# Patient Record
Sex: MALE | Race: WHITE | Employment: FULL TIME | URBAN - METROPOLITAN AREA
[De-identification: names, ages, dates, MRNs, and addresses within clinical notes are randomized per-mention and may not be internally consistent; named-entity substitution may affect disease eponyms.]

---

## 2018-05-01 PROBLEM — E66.9 OBESITY (BMI 35.0-39.9 WITHOUT COMORBIDITY): Status: ACTIVE | Noted: 2018-05-01

## 2019-05-03 ENCOUNTER — HOSPITAL ENCOUNTER (EMERGENCY)
Age: 49
Discharge: HOME | End: 2019-05-03
Attending: STUDENT IN AN ORGANIZED HEALTH CARE EDUCATION/TRAINING PROGRAM
Payer: COMMERCIAL

## 2019-05-03 ENCOUNTER — APPOINTMENT (OUTPATIENT)
Dept: GENERAL RADIOLOGY | Age: 49
End: 2019-05-03
Attending: STUDENT IN AN ORGANIZED HEALTH CARE EDUCATION/TRAINING PROGRAM
Payer: COMMERCIAL

## 2019-05-03 VITALS
HEART RATE: 80 BPM | SYSTOLIC BLOOD PRESSURE: 135 MMHG | BODY MASS INDEX: 37.89 KG/M2 | RESPIRATION RATE: 16 BRPM | DIASTOLIC BLOOD PRESSURE: 84 MMHG | TEMPERATURE: 98.1 F | HEIGHT: 68 IN | WEIGHT: 250 LBS | OXYGEN SATURATION: 99 %

## 2019-05-03 DIAGNOSIS — M25.521 ELBOW PAIN, RIGHT: Primary | ICD-10-CM

## 2019-05-03 PROCEDURE — 73080 X-RAY EXAM OF ELBOW: CPT

## 2019-05-03 PROCEDURE — 99283 EMERGENCY DEPT VISIT LOW MDM: CPT | Performed by: STUDENT IN AN ORGANIZED HEALTH CARE EDUCATION/TRAINING PROGRAM

## 2019-05-03 PROCEDURE — 74011250636 HC RX REV CODE- 250/636: Performed by: STUDENT IN AN ORGANIZED HEALTH CARE EDUCATION/TRAINING PROGRAM

## 2019-05-03 PROCEDURE — 96372 THER/PROPH/DIAG INJ SC/IM: CPT | Performed by: STUDENT IN AN ORGANIZED HEALTH CARE EDUCATION/TRAINING PROGRAM

## 2019-05-03 RX ORDER — IBUPROFEN 800 MG/1
800 TABLET ORAL
Qty: 20 TAB | Refills: 0 | Status: SHIPPED | OUTPATIENT
Start: 2019-05-03 | End: 2019-05-10

## 2019-05-03 RX ORDER — KETOROLAC TROMETHAMINE 30 MG/ML
30 INJECTION, SOLUTION INTRAMUSCULAR; INTRAVENOUS
Status: COMPLETED | OUTPATIENT
Start: 2019-05-03 | End: 2019-05-03

## 2019-05-03 RX ADMIN — KETOROLAC TROMETHAMINE 30 MG: 30 INJECTION, SOLUTION INTRAMUSCULAR at 18:18

## 2019-05-03 NOTE — ED NOTES
I have reviewed discharge instructions with the patient. The patient verbalized understanding. Patient left ED via Discharge Method: ambulatory to Home with self. Opportunity for questions and clarification provided. Patient given 1 scripts. To continue your aftercare when you leave the hospital, you may receive an automated call from our care team to check in on how you are doing. This is a free service and part of our promise to provide the best care and service to meet your aftercare needs.  If you have questions, or wish to unsubscribe from this service please call [phone]. Thank you for Choosing our [facility] Emergency Department.

## 2019-05-03 NOTE — DISCHARGE INSTRUCTIONS
Patient Education        Elbow: Exercises  Your Care Instructions  Here are some examples of exercises for elbows. Start each exercise slowly. Ease off the exercise if you start to have pain. Your doctor or physical therapist will tell you when you can start these exercises and which ones will work best for you. How to do the exercises  Wrist flexor stretch    1. Extend your arm in front of you with your palm up. 2. Bend your wrist, pointing your hand toward the floor. 3. With your other hand, gently bend your wrist farther until you feel a mild to moderate stretch in your forearm. 4. Hold for at least 15 to 30 seconds. Repeat 2 to 4 times. Wrist extensor stretch    1. Repeat steps 1 to 4 of the stretch above but begin with your extended hand palm down. Ball or sock squeeze    1. Hold a tennis ball (or a rolled-up sock) in your hand. 2. Make a fist around the ball (or sock) and squeeze. 3. Hold for about 6 seconds, and then relax for up to 10 seconds. 4. Repeat 8 to 12 times. 5. Switch the ball (or sock) to your other hand and do 8 to 12 times. Wrist deviation    1. Sit so that your arm is supported but your hand hangs off the edge of a flat surface, such as a table. 2. Hold your hand out like you are shaking hands with someone. 3. Move your hand up and down. 4. Repeat this motion 8 to 12 times. 5. Switch arms. 6. Try to do this exercise twice with each hand. Wrist curls    1. Place your forearm on a table with your hand hanging over the edge of the table, palm up. 2. Place a 1- to 2-pound weight in your hand. This may be a dumbbell, a can of food, or a filled water bottle. 3. Slowly raise and lower the weight while keeping your forearm on the table and your palm facing up. 4. Repeat this motion 8 to 12 times. 5. Switch arms, and do steps 1 through 4.  6. Repeat with your hand facing down toward the floor. Switch arms. Biceps curls    1.  Sit leaning forward with your legs slightly spread and your left hand on your left thigh. 2. Place your right elbow on your right thigh, and hold the weight with your forearm horizontal.  3. Slowly curl the weight up and toward your chest.  4. Repeat this motion 8 to 12 times. 5. Switch arms, and do steps 1 through 4. Follow-up care is a key part of your treatment and safety. Be sure to make and go to all appointments, and call your doctor if you are having problems. It's also a good idea to know your test results and keep a list of the medicines you take. Where can you learn more? Go to http://sandeep-beatriz.info/. Enter M279 in the search box to learn more about \"Elbow: Exercises. \"  Current as of: September 20, 2018  Content Version: 11.9  © 3002-6909 [employer]. Care instructions adapted under license by [employer] (which disclaims liability or warranty for this information). If you have questions about a medical condition or this instruction, always ask your healthcare professional. [place] [address] any warranty or liability for your use of this information. Patient Education     Musculoskeletal Pain: Care Instructions  Your Care Instructions  Different problems with the bones, muscles, nerves, ligaments, and tendons in the body can cause pain. One or more areas of your body may ache or burn. Or they may feel tired, stiff, or sore. The medical term for this type of pain is musculoskeletal pain. It can have many different causes. Sometimes the pain is caused by an injury such as a strain or sprain. Or you might have pain from using one part of your body in the same way over and over again. This is called overuse. In some cases, the cause of the pain is another health problem such as arthritis or fibromyalgia. The doctor will examine you and ask you questions about your health to help find the cause of your pain. Blood tests or imaging tests like an X-ray may also be helpful. But sometimes doctors can't find a cause of the pain. Treatment depends on your symptoms and the cause of the pain, if known. The doctor has checked you carefully, but problems can develop later. If you notice any problems or new symptoms, get medical treatment right away. Follow-up care is a key part of your treatment and safety. Be sure to make and go to all appointments, and call your doctor if you are having problems. It's also a good idea to know your test results and keep a list of the medicines you take. How can you care for yourself at home? · Rest until you feel better. · Do not do anything that makes the pain worse. Return to exercise gradually if you feel better and your doctor says it's okay. · Be safe with medicines. Read and follow all instructions on the label. ¨ If the doctor gave you a prescription medicine for pain, take it as prescribed. ¨ If you are not taking a prescription pain medicine, ask your doctor if you can take an over-the-counter medicine. · Put ice or a cold pack on the area for 10 to 20 minutes at a time to ease pain. Put a thin cloth between the ice and your skin. When should you call for help? Call your doctor now or seek immediate medical care if:  · You have new pain, or your pain gets worse. · You have new symptoms such as a fever, a rash, or chills. Watch closely for changes in your health, and be sure to contact your doctor if:  · You do not get better as expected. Where can you learn more? Go to [employer]be  Enter Q624 in the search box to learn more about \"Musculoskeletal Pain: Care Instructions. \"   © 1696-7622 [employer]. Care instructions adapted under license by [facility] (which disclaims liability or warranty for this information).  This care instruction is for use with your licensed healthcare professional. If you have questions about a medical condition or this instruction, always ask your healthcare professional. [address] any warranty or liability for your use of this information.   Content Version: 63.6.202058; Current as of: November 20, 2015
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
## 2019-06-12 ENCOUNTER — HOSPITAL ENCOUNTER (OUTPATIENT)
Dept: SURGERY | Age: 49
Discharge: HOME | End: 2019-06-12

## 2019-06-12 ENCOUNTER — ANESTHESIA EVENT (OUTPATIENT)
Dept: SURGERY | Age: 49
End: 2019-06-12
Payer: COMMERCIAL

## 2019-06-12 VITALS — HEIGHT: 68 IN | BODY MASS INDEX: 38.49 KG/M2 | WEIGHT: 254 LBS

## 2019-06-12 PROBLEM — S46.211A RUPTURE OF RIGHT DISTAL BICEPS TENDON: Status: ACTIVE | Noted: 2019-06-12

## 2019-06-12 RX ORDER — SODIUM CHLORIDE 0.9 % (FLUSH) 0.9 %
5-40 SYRINGE (ML) INJECTION EVERY 8 HOURS
Status: CANCELLED | OUTPATIENT
Start: 2019-06-12

## 2019-06-12 RX ORDER — CEFAZOLIN SODIUM/WATER 2 G/20 ML
2 SYRINGE (ML) INTRAVENOUS ONCE
Status: CANCELLED | OUTPATIENT
Start: 2019-06-12 | End: 2019-06-12

## 2019-06-12 RX ORDER — SODIUM CHLORIDE 0.9 % (FLUSH) 0.9 %
5-40 SYRINGE (ML) INJECTION AS NEEDED
Status: CANCELLED | OUTPATIENT
Start: 2019-06-12

## 2019-06-12 NOTE — BRIEF OP NOTE
BRIEF OPERATIVE NOTE    Date of Procedure: 6/13/2019     Preoperative Diagnosis:  DISTAL BICEPS TENDON RUPTURE RIGHT ELBOW    Postoperative Diagnosis:  SAME    Procedure(s): OPEN REPAIR DISTAL BICEPS TENDON RUPTURE RIGHT ELBOW VIA SINGLE INCISION TECHNIQUE    Surgeon(s) and Role:     * Wilmer Johnson MD - Primary         Assistant Staff:  Db Wang CFA      Surgical Staff:  Circ-1: (Unknown)  Scrub Tech-1: (Unknown)  Scrub Tech-2: (Unknown)  Scrub Tech-3: (Unknown)  No case tracking events are documented in the log. Anesthesia:  GENERAL WITH SUPRACLAVICULAR BLOCK    Estimated Blood Loss: 10 cc. Complications: NONE    Implants:   Implant Name Type Inv.  Item Serial No.  Lot No. LRB No. Used Action   ANCHOR SUT 2.3MM W/2.0MM BRAID --  - [id number]  ANCHOR SUT 2.3MM W/2.0MM BRAID --  [id number] [employer] [id number] Right 1 Implanted   ANCHOR SUT 2.3MM W/2.0MM BRAID --  - [id number]  ANCHOR SUT 2.3MM W/2.0MM BRAID --  [id number] [employer] [id number] Right 1 Implanted       TOURNIQUET:  55 MINUTES    Duke Davis MD

## 2019-06-12 NOTE — PERIOP NOTES
Patient verified name and . Order for consent not found in EHR. Type 1b surgery, PAT phone assessment complete. Orders not received. Labs per surgeon: none  Labs per anesthesia protocol: K+ (to be drawn DOS)    Patient answered medical/surgical history questions at their best of ability. All prior to admission medications documented in [facility]. Patient instructed to take the following medications the day of surgery according to anesthesia guidelines with a small sip of water: none . Hold all vitamins 7 days prior to surgery and NSAIDS 5 days prior to surgery. Prescription meds to hold:none    Patient instructed on the following:  Arrive at A Entrance, time of arrival to be called the day before by 1700  NPO after midnight including gum, mints, and ice chips  Responsible adult must drive patient to the hospital, stay during surgery, and patient will need supervision 24 hours after anesthesia  Use antibacterial soap in shower the night before surgery and on the morning of surgery  All piercings must be removed prior to arrival.    Leave all valuables (money and jewelry) at home but bring insurance card and ID on       DOS. Do not wear make-up, nail polish, lotions, cologne, perfumes, powders, or oil on skin. Patient teach back successful and patient demonstrates knowledge of instruction.
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
## 2019-06-13 ENCOUNTER — APPOINTMENT (OUTPATIENT)
Dept: GENERAL RADIOLOGY | Age: 49
End: 2019-06-13
Attending: ORTHOPAEDIC SURGERY
Payer: COMMERCIAL

## 2019-06-13 ENCOUNTER — ANESTHESIA (OUTPATIENT)
Dept: SURGERY | Age: 49
End: 2019-06-13
Payer: COMMERCIAL

## 2019-06-13 ENCOUNTER — HOSPITAL ENCOUNTER (OUTPATIENT)
Age: 49
Setting detail: OUTPATIENT SURGERY
Discharge: HOME | End: 2019-06-13
Attending: ORTHOPAEDIC SURGERY | Admitting: ORTHOPAEDIC SURGERY
Payer: COMMERCIAL

## 2019-06-13 VITALS
TEMPERATURE: 97.1 F | DIASTOLIC BLOOD PRESSURE: 70 MMHG | WEIGHT: 252.06 LBS | RESPIRATION RATE: 18 BRPM | HEART RATE: 91 BPM | OXYGEN SATURATION: 93 % | HEIGHT: 68 IN | SYSTOLIC BLOOD PRESSURE: 120 MMHG | BODY MASS INDEX: 38.2 KG/M2

## 2019-06-13 LAB
EST. AVERAGE GLUCOSE BLD GHB EST-MCNC: 131 MG/DL
GLUCOSE BLD STRIP.AUTO-MCNC: 99 MG/DL (ref 65–100)
HBA1C MFR BLD: 6.2 %
POTASSIUM SERPL-SCNC: 4 MMOL/L (ref 3.5–5.1)

## 2019-06-13 PROCEDURE — 73070 X-RAY EXAM OF ELBOW: CPT

## 2019-06-13 PROCEDURE — 77030037088 HC TUBE ENDOTRACH ORAL NSL COVD-A: Performed by: ANESTHESIOLOGY

## 2019-06-13 PROCEDURE — C1713 ANCHOR/SCREW BN/BN,TIS/BN: HCPCS | Performed by: ORTHOPAEDIC SURGERY

## 2019-06-13 PROCEDURE — 76210000020 HC REC RM PH II FIRST 0.5 HR: Performed by: ORTHOPAEDIC SURGERY

## 2019-06-13 PROCEDURE — 83036 HEMOGLOBIN GLYCOSYLATED A1C: CPT

## 2019-06-13 PROCEDURE — 76210000006 HC OR PH I REC 0.5 TO 1 HR: Performed by: ORTHOPAEDIC SURGERY

## 2019-06-13 PROCEDURE — 77030016570 HC BLNKT BAIR HGGR 3M -B: Performed by: ANESTHESIOLOGY

## 2019-06-13 PROCEDURE — 74011250636 HC RX REV CODE- 250/636: Performed by: ANESTHESIOLOGY

## 2019-06-13 PROCEDURE — 77030013708 HC HNDPC SUC IRR PULS STRY –B: Performed by: ORTHOPAEDIC SURGERY

## 2019-06-13 PROCEDURE — 76060000034 HC ANESTHESIA 1.5 TO 2 HR: Performed by: ORTHOPAEDIC SURGERY

## 2019-06-13 PROCEDURE — 77030031139 HC SUT VCRL2 J&J -A: Performed by: ORTHOPAEDIC SURGERY

## 2019-06-13 PROCEDURE — 77030002913 HC SUT ETHBND J&J -B: Performed by: ORTHOPAEDIC SURGERY

## 2019-06-13 PROCEDURE — 77030021678 HC GLIDESCP STAT DISP VERT -B: Performed by: ANESTHESIOLOGY

## 2019-06-13 PROCEDURE — 77030010509 HC AIRWY LMA MSK TELE -A: Performed by: ANESTHESIOLOGY

## 2019-06-13 PROCEDURE — 77030012935 HC DRSG AQUACEL BMS -B: Performed by: ORTHOPAEDIC SURGERY

## 2019-06-13 PROCEDURE — 77030002916 HC SUT ETHLN J&J -A: Performed by: ORTHOPAEDIC SURGERY

## 2019-06-13 PROCEDURE — 82962 GLUCOSE BLOOD TEST: CPT

## 2019-06-13 PROCEDURE — 77030008477 HC STYL SATN SLP COVD -A: Performed by: ANESTHESIOLOGY

## 2019-06-13 PROCEDURE — 76942 ECHO GUIDE FOR BIOPSY: CPT | Performed by: ORTHOPAEDIC SURGERY

## 2019-06-13 PROCEDURE — 77030018836 HC SOL IRR NACL ICUM -A: Performed by: ORTHOPAEDIC SURGERY

## 2019-06-13 PROCEDURE — 74011250636 HC RX REV CODE- 250/636

## 2019-06-13 PROCEDURE — 76010010054 HC POST OP PAIN BLOCK: Performed by: ORTHOPAEDIC SURGERY

## 2019-06-13 PROCEDURE — 77030011283 HC ELECTRD NDL COVD -A: Performed by: ORTHOPAEDIC SURGERY

## 2019-06-13 PROCEDURE — 76010000162 HC OR TIME 1.5 TO 2 HR INTENSV-TIER 1: Performed by: ORTHOPAEDIC SURGERY

## 2019-06-13 PROCEDURE — 77030002986 HC SUT PROL J&J -A: Performed by: ORTHOPAEDIC SURGERY

## 2019-06-13 PROCEDURE — 74011000250 HC RX REV CODE- 250

## 2019-06-13 PROCEDURE — 84132 ASSAY OF SERUM POTASSIUM: CPT

## 2019-06-13 PROCEDURE — 77030003602 HC NDL NRV BLK BBMI -B: Performed by: ANESTHESIOLOGY

## 2019-06-13 DEVICE — ICONIX 2 NEEDLES WITH INTELLIBRAID TECHNOLOGY, 2.3MM ANCHOR WITH 2 STRANDS #2 FORCE FIBER
Type: IMPLANTABLE DEVICE | Site: ELBOW | Status: FUNCTIONAL
Brand: ICONIX

## 2019-06-13 RX ORDER — SUCCINYLCHOLINE CHLORIDE 20 MG/ML
INJECTION INTRAMUSCULAR; INTRAVENOUS AS NEEDED
Status: DISCONTINUED | OUTPATIENT
Start: 2019-06-13 | End: 2019-06-13 | Stop reason: HOSPADM

## 2019-06-13 RX ORDER — DEXAMETHASONE SODIUM PHOSPHATE 4 MG/ML
INJECTION, SOLUTION INTRA-ARTICULAR; INTRALESIONAL; INTRAMUSCULAR; INTRAVENOUS; SOFT TISSUE AS NEEDED
Status: DISCONTINUED | OUTPATIENT
Start: 2019-06-13 | End: 2019-06-13 | Stop reason: HOSPADM

## 2019-06-13 RX ORDER — LIDOCAINE HYDROCHLORIDE 20 MG/ML
INJECTION, SOLUTION EPIDURAL; INFILTRATION; INTRACAUDAL; PERINEURAL AS NEEDED
Status: DISCONTINUED | OUTPATIENT
Start: 2019-06-13 | End: 2019-06-13 | Stop reason: HOSPADM

## 2019-06-13 RX ORDER — CEFAZOLIN SODIUM/WATER 2 G/20 ML
2 SYRINGE (ML) INTRAVENOUS ONCE
Status: DISCONTINUED | OUTPATIENT
Start: 2019-06-13 | End: 2019-06-13 | Stop reason: HOSPADM

## 2019-06-13 RX ORDER — SODIUM CHLORIDE 0.9 % (FLUSH) 0.9 %
5-40 SYRINGE (ML) INJECTION AS NEEDED
Status: DISCONTINUED | OUTPATIENT
Start: 2019-06-13 | End: 2019-06-13 | Stop reason: HOSPADM

## 2019-06-13 RX ORDER — FENTANYL CITRATE 50 UG/ML
INJECTION, SOLUTION INTRAMUSCULAR; INTRAVENOUS AS NEEDED
Status: DISCONTINUED | OUTPATIENT
Start: 2019-06-13 | End: 2019-06-13 | Stop reason: HOSPADM

## 2019-06-13 RX ORDER — MIDAZOLAM HYDROCHLORIDE 1 MG/ML
2 INJECTION, SOLUTION INTRAMUSCULAR; INTRAVENOUS
Status: DISCONTINUED | OUTPATIENT
Start: 2019-06-13 | End: 2019-06-13 | Stop reason: HOSPADM

## 2019-06-13 RX ORDER — OXYCODONE HYDROCHLORIDE 5 MG/1
5 TABLET ORAL
Status: DISCONTINUED | OUTPATIENT
Start: 2019-06-13 | End: 2019-06-13 | Stop reason: HOSPADM

## 2019-06-13 RX ORDER — SODIUM CHLORIDE, SODIUM LACTATE, POTASSIUM CHLORIDE, CALCIUM CHLORIDE 600; 310; 30; 20 MG/100ML; MG/100ML; MG/100ML; MG/100ML
100 INJECTION, SOLUTION INTRAVENOUS CONTINUOUS
Status: DISCONTINUED | OUTPATIENT
Start: 2019-06-13 | End: 2019-06-13 | Stop reason: HOSPADM

## 2019-06-13 RX ORDER — FENTANYL CITRATE 50 UG/ML
100 INJECTION, SOLUTION INTRAMUSCULAR; INTRAVENOUS ONCE
Status: COMPLETED | OUTPATIENT
Start: 2019-06-13 | End: 2019-06-13

## 2019-06-13 RX ORDER — SODIUM CHLORIDE 0.9 % (FLUSH) 0.9 %
5-40 SYRINGE (ML) INJECTION EVERY 8 HOURS
Status: DISCONTINUED | OUTPATIENT
Start: 2019-06-13 | End: 2019-06-13 | Stop reason: HOSPADM

## 2019-06-13 RX ORDER — NALOXONE HYDROCHLORIDE 0.4 MG/ML
0.04 INJECTION, SOLUTION INTRAMUSCULAR; INTRAVENOUS; SUBCUTANEOUS
Status: DISCONTINUED | OUTPATIENT
Start: 2019-06-13 | End: 2019-06-13 | Stop reason: HOSPADM

## 2019-06-13 RX ORDER — ONDANSETRON 2 MG/ML
INJECTION INTRAMUSCULAR; INTRAVENOUS AS NEEDED
Status: DISCONTINUED | OUTPATIENT
Start: 2019-06-13 | End: 2019-06-13 | Stop reason: HOSPADM

## 2019-06-13 RX ORDER — HYDROMORPHONE HYDROCHLORIDE 2 MG/ML
0.5 INJECTION, SOLUTION INTRAMUSCULAR; INTRAVENOUS; SUBCUTANEOUS
Status: DISCONTINUED | OUTPATIENT
Start: 2019-06-13 | End: 2019-06-13 | Stop reason: HOSPADM

## 2019-06-13 RX ORDER — PROPOFOL 10 MG/ML
INJECTION, EMULSION INTRAVENOUS AS NEEDED
Status: DISCONTINUED | OUTPATIENT
Start: 2019-06-13 | End: 2019-06-13 | Stop reason: HOSPADM

## 2019-06-13 RX ORDER — EPHEDRINE SULFATE 50 MG/ML
INJECTION, SOLUTION INTRAVENOUS AS NEEDED
Status: DISCONTINUED | OUTPATIENT
Start: 2019-06-13 | End: 2019-06-13 | Stop reason: HOSPADM

## 2019-06-13 RX ORDER — LIDOCAINE HYDROCHLORIDE 10 MG/ML
0.1 INJECTION INFILTRATION; PERINEURAL AS NEEDED
Status: DISCONTINUED | OUTPATIENT
Start: 2019-06-13 | End: 2019-06-13 | Stop reason: HOSPADM

## 2019-06-13 RX ORDER — MIDAZOLAM HYDROCHLORIDE 1 MG/ML
2 INJECTION, SOLUTION INTRAMUSCULAR; INTRAVENOUS ONCE
Status: COMPLETED | OUTPATIENT
Start: 2019-06-13 | End: 2019-06-13

## 2019-06-13 RX ADMIN — LIDOCAINE HYDROCHLORIDE 60 MG: 20 INJECTION, SOLUTION EPIDURAL; INFILTRATION; INTRACAUDAL; PERINEURAL at 13:45

## 2019-06-13 RX ADMIN — EPHEDRINE SULFATE 10 MG: 50 INJECTION, SOLUTION INTRAVENOUS at 14:50

## 2019-06-13 RX ADMIN — FENTANYL CITRATE 50 MCG: 50 INJECTION, SOLUTION INTRAMUSCULAR; INTRAVENOUS at 13:45

## 2019-06-13 RX ADMIN — ONDANSETRON 4 MG: 2 INJECTION INTRAMUSCULAR; INTRAVENOUS at 15:03

## 2019-06-13 RX ADMIN — PROPOFOL 100 MG: 10 INJECTION, EMULSION INTRAVENOUS at 13:47

## 2019-06-13 RX ADMIN — SODIUM CHLORIDE, SODIUM LACTATE, POTASSIUM CHLORIDE, AND CALCIUM CHLORIDE: 600; 310; 30; 20 INJECTION, SOLUTION INTRAVENOUS at 13:41

## 2019-06-13 RX ADMIN — PROPOFOL 300 MG: 10 INJECTION, EMULSION INTRAVENOUS at 13:45

## 2019-06-13 RX ADMIN — SODIUM CHLORIDE, SODIUM LACTATE, POTASSIUM CHLORIDE, AND CALCIUM CHLORIDE: 600; 310; 30; 20 INJECTION, SOLUTION INTRAVENOUS at 14:13

## 2019-06-13 RX ADMIN — LIDOCAINE HYDROCHLORIDE 0.1 ML: 10 INJECTION, SOLUTION INFILTRATION; PERINEURAL at 10:50

## 2019-06-13 RX ADMIN — EPHEDRINE SULFATE 10 MG: 50 INJECTION, SOLUTION INTRAVENOUS at 14:16

## 2019-06-13 RX ADMIN — FENTANYL CITRATE 100 MCG: 50 INJECTION INTRAMUSCULAR; INTRAVENOUS at 11:44

## 2019-06-13 RX ADMIN — SODIUM CHLORIDE, SODIUM LACTATE, POTASSIUM CHLORIDE, AND CALCIUM CHLORIDE 100 ML/HR: 600; 310; 30; 20 INJECTION, SOLUTION INTRAVENOUS at 10:50

## 2019-06-13 RX ADMIN — MIDAZOLAM 2 MG: 1 INJECTION INTRAMUSCULAR; INTRAVENOUS at 11:44

## 2019-06-13 RX ADMIN — SUCCINYLCHOLINE CHLORIDE 200 MG: 20 INJECTION INTRAMUSCULAR; INTRAVENOUS at 13:49

## 2019-06-13 RX ADMIN — DEXAMETHASONE SODIUM PHOSPHATE 10 MG: 4 INJECTION, SOLUTION INTRA-ARTICULAR; INTRALESIONAL; INTRAMUSCULAR; INTRAVENOUS; SOFT TISSUE at 14:01

## 2019-06-13 NOTE — ANESTHESIA PREPROCEDURE EVALUATION
Relevant Problems   No relevant active problems       Anesthetic History   No history of anesthetic complications            Review of Systems / Medical History  Pertinent labs reviewed    Pulmonary        Sleep apnea: CPAP           Neuro/Psych         Psychiatric history     Cardiovascular    Hypertension              Exercise tolerance: >4 METS     GI/Hepatic/Renal     GERD: well controlled           Endo/Other        Obesity     Other Findings              Physical Exam    Airway  Mallampati: III  TM Distance: 4 - 6 cm  Neck ROM: normal range of motion, short neck   Mouth opening: Normal     Cardiovascular  Regular rate and rhythm,  S1 and S2 normal,  no murmur, click, rub, or gallop             Dental  No notable dental hx       Pulmonary  Breath sounds clear to auscultation               Abdominal  GI exam deferred       Other Findings            Anesthetic Plan    ASA: 2  Anesthesia type: general      Post-op pain plan if not by surgeon: peripheral nerve block single    Induction: Intravenous  Anesthetic plan and risks discussed with: Patient and Spouse

## 2019-06-13 NOTE — DISCHARGE INSTRUCTIONS
INSTRUCTIONS FOLLOWING ARTHROSCOPY SURGERY  Dr. Delon Moscoso [phone]    ACTIVITY   As tolerated and as directed by your doctor   Elevate surgery site first 48 hours.  Use arm sling or crutches per your doctors instructions.  Bathe or shower as directed by your doctor. DIET   Clear liquids until no nausea or vomiting; then light diet for the first day   Advance to regular diet on second day, unless your doctor orders otherwise.  If nausea and vomiting continues, call your doctor. PAIN   Take pain medication as directed by your doctor.  Call your doctor if pain is NOT relieved by medication.  DO NOT take aspirin or blood thinners until directed by your doctor. DRESSING CARE: Follow all dressing care instructions provided by Dr. Nasim Ratliff will be made by nursing staff.  If you have any problems or concerns, call your doctor as needed. CALL YOUR DOCTOR IF   Excessive bleeding that does not stop after holding mild pressure over the area   Temperature of 101°F or above   Redness, excessive swelling or bruising, and/or green or yellow, smelly discharge from incision    AFTER ANESTHESIA   For the next 24 hours: DO NOT Drive, Drink alcoholic beverages, or Make important decisions.  Be aware of dizziness following anesthesia and while taking pain medication.

## 2019-06-13 NOTE — ANESTHESIA PROCEDURE NOTES
Peripheral Block    Start time: 6/13/2019 11:44 AM  End time: 6/13/2019 11:50 AM  Performed by: Marvel Singh MD  Authorized by: Marvel Singh MD       Pre-procedure: Indications: at surgeon's request, post-op pain management and procedure for pain    Preanesthetic Checklist: patient identified, risks and benefits discussed, site marked, timeout performed, anesthesia consent given and patient being monitored    Timeout Time: 11:44          Block Type:   Block Type:  Supraclavicular  Laterality:  Right  Monitoring:  Standard ASA monitoring, continuous pulse ox, frequent vital sign checks, heart rate, oxygen and responsive to questions  Injection Technique:  Single shot  Procedures: ultrasound guided and nerve stimulator    Prep: chlorhexidine    Needle Type:  Stimuplex (4 Inch)  Needle Gauge:  20 G  Needle Localization:  Ultrasound guidance and nerve stimulator  Motor Response: minimal motor response >0.4 mA      Assessment:  Number of attempts:  1  Injection Assessment:  Incremental injection every 5 mL, local visualized surrounding nerve on ultrasound, negative aspiration for blood, no paresthesia, no intravascular symptoms and ultrasound image on chart  Patient tolerance:  Patient tolerated the procedure well with no immediate complications  3 cc 1% lidocaine injected at site of needle insertion. Needle inserted and placed in close proximity to the nerve under real time ultrasound guidance. Ultrasound was used to visualize the spread of local anesthetic in close proximity to the nerve being blocked. The nerve appeared anatomically normal and there were no abnormal findings.

## 2019-06-13 NOTE — ANESTHESIA POSTPROCEDURE EVALUATION
Procedure(s):  OPEN REPAIR DISTAL BICEPS TENDON RUPTURE RIGHT ELBOW VIA SINGLE INCISION TECHNIQUE.     general    Anesthesia Post Evaluation        Patient location during evaluation: PACU  Patient participation: complete - patient participated  Level of consciousness: awake  Pain management: satisfactory to patient  Airway patency: patent  Anesthetic complications: no  Cardiovascular status: hemodynamically stable  Respiratory status: spontaneous ventilation  Hydration status: euvolemic  Post anesthesia nausea and vomiting:  none      Vitals Value Taken Time   /72 6/13/2019  3:37 PM   Temp 36.2 °C (97.1 °F) 6/13/2019  3:22 PM   Pulse 84 6/13/2019  3:37 PM   Resp 18 6/13/2019  3:37 PM   SpO2 98 % 6/13/2019  3:37 PM

## 2019-06-13 NOTE — H&P
Date of Surgery Update:  Lc Sears was seen and examined. History and physical has been reviewed. The patient has been examined.  There have been no significant clinical changes since the completion of the originally dated History and Physical.    Signed By: Delmus Burkitt., MD     June 13, 2019 10:59 AM

## 2019-06-14 NOTE — OP NOTES
[place]  OPERATIVE REPORT    Name:  Julian Ascencio  MR#:  [id number]  :  1970  ACCOUNT #:  [de-identified]  DATE OF SERVICE:  2019    PREOPERATIVE DIAGNOSIS:  Distal biceps tendon rupture, right elbow. POSTOPERATIVE DIAGNOSIS:  Distal biceps tendon rupture, right elbow. PROCEDURE PERFORMED:  Open repair of the distal biceps tendon rupture of right elbow via single incision technique. OPERATIVE SURGEON:  Idalia Chapman. Magui Dinh MD    ASSISTANT:  Nadja Coyne, Certified First Assistant. Use of a certified first assistant was necessary to optimize the patient's safety and outcome. ANESTHESIA:  General with a supraclavicular block. COMPLICATIONS:  None. IMPLANTS:  Hardware utilized are two Iconix 2.3 anchors. ESTIMATED BLOOD LOSS:  20 mL. PATHOLOGY:  Distal biceps tendon rupture, right elbow. CPT CODE:  20506. ICD-10 CODE:  S21.230. INDICATIONS:  The patient is a 48-year-old gentleman who was moving a basketball goal when injured his right elbow. Preoperative physical exam and radiographic studies including an MRI demonstrated a distal biceps tendon rupture of right elbow. The patient has exhausted nonoperative modalities and electively admitted for operative intervention. PROCEDURE:  Following identification of the patient, the patient was taken to the operative suite. Following administration of general anesthesia, a supraclavicular block for postop pain control and 3 g of IV Ancef, the patient was then positioned on the operating room table in supine fashion. Right arm was then prepped and draped in sterile fashion. Right arm was then elevated and exsanguinated with an Esmarch bandage. Tourniquet was elevated to 250 mmHg. At this point, a 3 cm transverse incision in the antecubital fossa was then performed. Skin was incised. Subcutaneous tissue was then dissected down to the brachioradialis and pronator teres.   These were identified and retracted. Biceps tendon was identified. It was ruptured distally. It was completely mobilized. Frayed edges of the tendon were then debrided and the biceps was then completely mobilized. At this point, with the arm in supination, the bicipital tuberosity of the radius was then identified. Care was taken to retract the soft tissues. Once the bicipital tuberosity of the radius was identified. Two Iconix 2.3 anchors were placed proximally and distally in the tuberosity. These had excellent fixation and were firmly seated. At this point, the biceps was identified, suture anchors were then passed through the remaining biceps tendon in a modified Sims type fashion of the medial and lateral aspects. Both sutures were utilized. Once this was achieved the tendon was then reduced and all sutures were secured in place. This yielded an excellent repair of our distal biceps tendon, which was stable to full range of motion. Once the biceps tendon was then secured, the wound was irrigated the tourniquet was released and the tourniquet time was 55 minutes. Hemostasis was obtained with Bovie electrocautery. The wound was then irrigated and closed with 0 Vicryl figure-of-eight sutures and 2-0 nylon horizontal mattress sutures. An Aquacel dressing was applied. A posterior splint was applied. The patient was then transferred to the recovery room in stable condition.         Wang Murillo MD      [id number]  D:  06/13/2019 21:31  T:  06/14/2019 2:57  JOB #:  [id number]  CC:  Chloe Mendez MD

## 2019-07-08 ENCOUNTER — HOSPITAL ENCOUNTER (OUTPATIENT)
Dept: PHYSICAL THERAPY | Age: 49
Discharge: HOME | End: 2019-07-08
Payer: COMMERCIAL

## 2019-07-08 PROCEDURE — 97140 MANUAL THERAPY 1/> REGIONS: CPT

## 2019-07-08 PROCEDURE — 97161 PT EVAL LOW COMPLEX 20 MIN: CPT
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
## 2019-07-09 NOTE — PROGRESS NOTES
Barrera Reyes  : 1970  Payor: [employer] / Plan: [address] / Product Type: PPO /  [address] at [facility] DIA MCMAHON  [address] [facility], [address] [place] [address],[address], [address]  [phone]   [phone]       OUTPATIENT PHYSICAL THERAPY: Daily Treatment Note 2019  Visit Count: 1     ICD-10: Treatment Diagnosis: Pain in right elbow (M25.521)  Precautions/Allergies:   Ace inhibitors   MD Orders: Evaluate and treat \"gentle elbow and hand motion\" MEDICAL/REFERRING DIAGNOSIS:  R distal Biceps Repair   DATE OF ONSET: Surgery 19, injury 5/3/19  REFERRING PHYSICIAN: Mariela Martínez MD  RETURN PHYSICIAN APPOINTMENT: 19       Pre-treatment Symptoms/Complaints:  See eval note from same day. Pain: Initial:  5/10  Post Session:  No VAS   Medications Last Reviewed: 19    Updated Objective Findings:   See evaluation note from today     TREATMENT:     MANUAL THERAPY: (25 minutes) to improve R elbow range of motion. Grade 2-3 physio mobilizations to R elbow to improve R elbow extension and flexion range of motion, and reduce discomfort. HEP: Provided patient with a handout for passive self-assisted R elbow flexion and extension, supination AROM, and towel gripping and hand opening/closing. Pt verbalized understanding. Treatment/Session Summary:    · Response to Treatment:  Patient tolerated motion work well with little discomfort. Does expereince pain at end range of motion, with subsequent increase in muscle guarding. .  · Communication/Consultation:  None today  · Equipment provided today:  None today  · Recommendations/Intent for next treatment session: Next visit will focus on improving R elbow ROM. Treatment Plan of Care Effective Dates:  19 to 2019. Frequency/Duration: 2-3 visits per week for 12 weeks.         Total Treatment Billable Duration:  45 minutes (25 minutes for treatment)  PT Patient Time In/Time Out  Time In: 1700  Time Out: [address], PT    No future appointments.

## 2019-07-17 ENCOUNTER — HOSPITAL ENCOUNTER (OUTPATIENT)
Dept: PHYSICAL THERAPY | Age: 49
Discharge: HOME | End: 2019-07-17
Payer: COMMERCIAL

## 2019-07-17 PROCEDURE — 97110 THERAPEUTIC EXERCISES: CPT

## 2019-07-17 NOTE — PROGRESS NOTES
Ria Zendejas  : 1970  Payor: [facility] / Plan: [facility] / Product Type: PPO /  [address] at [facility] DIA MCMAHON  1101 [facility], [address] [place] [address],[address], [facility] [address]  [phone]   [phone]       OUTPATIENT PHYSICAL THERAPY: Daily Treatment Note 2019  Visit Count: 2     ICD-10: Treatment Diagnosis: Pain in right elbow (M25.521)  Precautions/Allergies:   Ace inhibitors   MD Orders: Evaluate and treat \"gentle elbow and hand motion\" MEDICAL/REFERRING DIAGNOSIS:  R distal Biceps Repair   DATE OF ONSET: Surgery 19, injury 5/3/19  REFERRING PHYSICIAN: Antonino Barroso MD  RETURN PHYSICIAN APPOINTMENT: 19       Pre-treatment Symptoms/Complaints:  Has been performing his home exercise program and has noticed improved motion through R elbow, particularly with extension ROM. Patient remains concerned about his lack of R thumb extension and abduction strength. Pain: Initial:  No VAS Post Session:  No VAS   Medications Last Reviewed: 19    Updated Objective Findings:   Did not formally measure elbow range, but patient close to full R elbow extension. TREATMENT:     MANUAL THERAPY: (30 minutes) to improve R elbow range of motion. Grade 3 physio mobilizations to R elbow to improve R elbow extension/flexion and supination/protation range of motion, and reduce discomfort. THERAPEUTIC EXERCISE: (5 minutes) to improve R thumb strength. Performed thumb extension and abduction in gravity minimized position on table top to improve hand function. HEP: Advised patient to continue moving R wrist and hand as much as possible to improve thumb function. Treatment/Session Summary:    · Response to Treatment:  Patient's elbow extension range of motion has progressed very well. Supination remains limited.  Very limited with R thumb extension and abduction and will need continued strengthening of this muscle group to improve R hand function. · Communication/Consultation:  None today  · Equipment provided today:  None today  · Recommendations/Intent for next treatment session: Next visit will focus on improving R elbow ROM. Treatment Plan of Care Effective Dates:  7/8/19 to 9/30/2019. Frequency/Duration: 2-3 visits per week for 12 weeks.         Total Treatment Billable Duration:  35 minutes   PT Patient Time In/Time Out  Time In: 1700  Time Out: [address] PT    Future Appointments   Date Time Provider Lizett De Souza   7/17/2019  5:00 PM Riana Bradley, PT [facility]   7/19/2019 11:15 AM Merari Allen, PT [facility]   7/22/2019  4:30 PM Riana Bradley, PT [facility]   7/26/2019  1:15 PM Merari Allen, PT [facility]   7/29/2019  4:45 PM Riana Bradley, PT [facility]   7/31/2019  8:15 AM Martell Webb., PT [facility]

## 2019-07-19 ENCOUNTER — HOSPITAL ENCOUNTER (OUTPATIENT)
Dept: PHYSICAL THERAPY | Age: 49
Discharge: HOME | End: 2019-07-19
Payer: COMMERCIAL

## 2019-07-19 PROCEDURE — 97140 MANUAL THERAPY 1/> REGIONS: CPT

## 2019-07-19 NOTE — PROGRESS NOTES
David Menchaca  : 1970  Payor: [facility] / Plan: [address] / Product Type: PPO /  [address] at [facility] DIA MCMAHON  1101 [facility], [address] [place] [address],[address], [facility] [address]  [phone]   [phone]       OUTPATIENT PHYSICAL THERAPY: Daily Treatment Note 2019  Visit Count: 3     ICD-10: Treatment Diagnosis: Pain in right elbow (M25.521)  Precautions/Allergies:   Ace inhibitors   MD Orders: Evaluate and treat \"gentle elbow and hand motion\" MEDICAL/REFERRING DIAGNOSIS:  R distal Biceps Repair   DATE OF ONSET: Surgery 19, injury 5/3/19  REFERRING PHYSICIAN: Nasir Carnes MD  RETURN PHYSICIAN APPOINTMENT: 19       Pre-treatment Symptoms/Complaints:  Patient remains concerned about his lack of R thumb extension and abduction strength and is being compliant with HEP. Pain: Initial:  No VAS Post Session:  No VAS   Medications Last Reviewed: 19    Updated Objective Findings:   Did not formally measure elbow range, but patient close to full R elbow extension. TREATMENT:     MANUAL THERAPY: (25 minutes) to improve R elbow range of motion. Grade 3 physio mobilizations to R elbow to improve R elbow extension/flexion and supination/protation range of motion, and reduce discomfort. Worked both in elbow extension and flexion for supination/pronation. ALso did deep  myofascial release to R forearms- anterior and posterior and gentle scar moblization    THERAPEUTIC EXERCISE: (0 minutes) not today    HEP: Pt to do HEP in chart and to continue moving R wrist and hand as much as possible to improve thumb function. Treatment/Session Summary:    · Response to Treatment:  Patient's elbow extension range of motion has progressed very well. Supination remains limited. Very limited with R thumb extension and abduction and will need continued strengthening of this muscle group to improve R hand function.   · Communication/Consultation:  None today  · Equipment provided today:  None today  · Recommendations/Intent for next treatment session: Next visit will focus on improving R elbow ROM. Treatment Plan of Care Effective Dates:  7/8/19 to 9/30/2019. Frequency/Duration: 2-3 visits per week for 12 weeks.         Total Treatment Billable Duration:  25 minutes   PT Patient Time In/Time Out  Time In: 3206  Time Out: [address], PT    Future Appointments   Date Time Provider Lizett De Souza   7/22/2019  4:30 PM Melodie Simon., PT [facility]   7/26/2019  1:15 PM [facility]   7/29/2019  4:45 PM Melodie Simon., PT [facility]   7/31/2019  8:15 AM [facility]evan Calle., [facility]

## 2019-07-22 ENCOUNTER — HOSPITAL ENCOUNTER (OUTPATIENT)
Dept: PHYSICAL THERAPY | Age: 49
Discharge: HOME | End: 2019-07-22
Payer: COMMERCIAL

## 2019-07-22 PROCEDURE — 97140 MANUAL THERAPY 1/> REGIONS: CPT

## 2019-07-22 NOTE — PROGRESS NOTES
Toshia Sanchez  : 1970  Payor: [facility] / Plan: [facility] / Product Type: PPO /  [address] at [facility] DIA MCMAHON  1101 [facility], [address] [place] [address],[address], [facility] [address]  [phone]   [phone]       OUTPATIENT PHYSICAL THERAPY: Daily Treatment Note 2019  Visit Count: 4     ICD-10: Treatment Diagnosis: Pain in right elbow (M25.521)  Precautions/Allergies:   Ace inhibitors   MD Orders: Evaluate and treat \"gentle elbow and hand motion\" MEDICAL/REFERRING DIAGNOSIS:  R distal Biceps Repair   DATE OF ONSET: Surgery 19, injury 5/3/19  REFERRING PHYSICIAN: Rashel Dempsey MD  RETURN PHYSICIAN APPOINTMENT: 19       Pre-treatment Symptoms/Complaints:  Patient sees Dr. Heraclio Mosley in the AM. Limited if any improvement in R thumb abduction and extension. Overall elbow is doing well. Pain: Initial:  No VAS Post Session:  No VAS   Medications Last Reviewed: 19    Updated Objective Findings:   None today. TREATMENT:     MANUAL THERAPY: (40 minutes) to improve R elbow range of motion. Grade 3-4 physio mobilizations to R elbow to improve R elbow extension/flexion and supination/protation range of motion, and reduce discomfort. Worked both in elbow extension and flexion for supination/pronation. Manual hand opening/closing to facilitate improved R thumb and hand function. THERAPEUTIC EXERCISE: (0 minutes) not today    HEP: Pt to do HEP in chart and to continue moving R wrist and hand as much as possible to improve thumb function. Treatment/Session Summary:    · Response to Treatment:  Good progress with R elbow ROM. Supination and pronation ROM have progressed well from last appointment. Thumb function remains limited. · Communication/Consultation:  None today  · Equipment provided today:  None today  · Recommendations/Intent for next treatment session: Next visit will focus on improving R elbow ROM.     Treatment Plan of Care Effective Dates:  19 to 9/30/2019. Frequency/Duration: 2-3 visits per week for 12 weeks.         Total Treatment Billable Duration:  40 minutes   PT Patient Time In/Time Out  Time In: 1630  Time Out: [address], PT    Future Appointments   Date Time Provider Lizett De Souza   7/22/2019  4:30 PM Meño Fitzgerald, PT [facility]   7/26/2019  1:15 PM Brittany Stevenson, PT [facility]   7/29/2019  4:45 PM Meño Fitzgerald, PT [facility]   7/31/2019  8:15 AM Maria E Banegas., PT [facility]

## 2019-07-26 ENCOUNTER — HOSPITAL ENCOUNTER (OUTPATIENT)
Dept: PHYSICAL THERAPY | Age: 49
Discharge: HOME | End: 2019-07-26
Payer: COMMERCIAL

## 2019-07-26 PROCEDURE — 97140 MANUAL THERAPY 1/> REGIONS: CPT

## 2019-07-26 PROCEDURE — 97110 THERAPEUTIC EXERCISES: CPT

## 2019-07-26 NOTE — PROGRESS NOTES
Lureyna Look  : 1970  Payor: [facility] / Plan: [facility] / Product Type: PPO /  [address] at [facility] DIA MCMAHON  1101 [facility], [address] [place] [address],[address], [facility] [address]  [phone]   [phone]       OUTPATIENT PHYSICAL THERAPY: Daily Treatment Note 2019  Visit Count: 5     ICD-10: Treatment Diagnosis: Pain in right elbow (M25.521)  Precautions/Allergies:   Ace inhibitors   MD Orders: Evaluate and treat ; HEP, strengthening, ROM, full ROM/full strength for elbow and hand MEDICAL/REFERRING DIAGNOSIS:  R distal Biceps Repair   DATE OF ONSET: Surgery 19, injury 5/3/19  REFERRING PHYSICIAN: Lindsey Oreilly MD  RETURN PHYSICIAN APPOINTMENT: 19       Pre-treatment Symptoms/Complaints:  Patient received new order. Pain: Initial:  No VAS Post Session:  No VAS   Medications Last Reviewed: 19    Updated Objective Findings:   Pt comes in without brace today. Shoulder 90/90 position causes pain to posterior shoulder. Tightness at forearm near incision- pronator teres  Tender to palpation at infraspinatus tendon  Good ROM for R elbow with end range supination stiffness  Good ROM for R shoulder with end range flexion and 90/90 ER stiffness. Unable to fully extend 2nd digit or do active thumb extension. TREATMENT:     MANUAL THERAPY: (15 minutes) to improve R elbow range of motion. -- Grade 3-4 physio mobilizations to R elbow to improve R elbow extension/flexion and supination/protation range of motion, and reduce discomfort.    --  myofascial release at supinator, pronator teres  -- grade 3 GH AP mob to improve posterior capsule mobiliy    --  Transverse friction massage to R long head biceps tendon    THERAPEUTIC EXERCISE: (25 minutes)   -- self AP glide to [address] in supine  -- in supine: shoulder flexion 10x, PNF D2 10x  -- UBE Level 1 5 min  -- sitting thoracic extension over ball-- 10 sec x 10  --Prone R shoulder HORZ ABD and EXT 2#  --  elbow flexion 2# 10x 3  -- triceps 2# 30x  -- gentle resistance to thumb abduction in directions he was able to perform. HEP: Pt to do updated HEP in chart and to continue moving R wrist and hand as much as possible to improve thumb function. Treatment/Session Summary:    · Response to Treatment:  Tolerated exercise well. Less posterior shoulder pain with 90/90 after mob to posterior capsule. Less anterior shoulder pain at long head biceps with shoulder HORZ ABD after transverse friction massage to long head biceps. Educated pt to do the same to himself. · Communication/Consultation:  None today  · Equipment provided today:  None today  · Recommendations/Intent for next treatment session: Next visit will focus on improving R elbow ROM. Treatment Plan of Care Effective Dates:  7/8/19 to 9/30/2019. Frequency/Duration: 2-3 visits per week for 12 weeks.         Total Treatment Billable Duration:  40 minutes   PT Patient Time In/Time Out  Time In: 1320  Time Out: [address], PT    Future Appointments   Date Time Provider Lizett De Souza   7/29/2019  4:45 PM Celso Narayanan., PT [facility]   7/31/2019  8:15 AM [facility]elie Moore., [facility]

## 2019-07-29 ENCOUNTER — HOSPITAL ENCOUNTER (OUTPATIENT)
Dept: PHYSICAL THERAPY | Age: 49
Discharge: HOME | End: 2019-07-29
Payer: COMMERCIAL

## 2019-07-29 PROCEDURE — 97110 THERAPEUTIC EXERCISES: CPT

## 2019-07-29 PROCEDURE — 97140 MANUAL THERAPY 1/> REGIONS: CPT

## 2019-07-29 NOTE — PROGRESS NOTES
Lety Lazo  : 1970  Payor: [facility] / Plan: [facility] / Product Type: PPO /  [address] [facility]  [facility], [address], [place] [address].  [phone]   [phone]       OUTPATIENT PHYSICAL THERAPY: Daily Treatment Note 2019  Visit Count: 6     ICD-10: Treatment Diagnosis: Pain in right elbow (M25.521)  Precautions/Allergies:   Ace inhibitors   MD Orders: Evaluate and treat ; HEP, strengthening, ROM, full ROM/full strength for elbow and hand MEDICAL/REFERRING DIAGNOSIS:  R distal Biceps Repair   DATE OF ONSET: Surgery 19, injury 5/3/19  REFERRING PHYSICIAN: Dileep Fitch MD  RETURN PHYSICIAN APPOINTMENT: 19       Pre-treatment Symptoms/Complaints: Mowed the lawn over the weekend and felt a little bit of R UE discomfort but overall not bad. Still has limited use of R thumb. Pain: Initial:  None Post Session:  No VAS   Medications Last Reviewed: 19    Updated Objective Findings:   None     TREATMENT:     MANUAL THERAPY: (15 minutes) to improve R elbow range of motion. Grade 3-4 R elbow mobilizations to improve supination of R forearm. Scar mobilization to incision at R distal biceps. THERAPEUTIC EXERCISE: (25 minutes) per grid below to improve R UE strength and function. Passive ranging to R shoulder for flexion, abduction, external and internal rotation, and R elbow flexion and extension. Date:  19 Date:   Date:     Activity/Exercise Parameters Parameters Parameters   Supine press  REclined  3#   5#      Bicep curls Supinated  2#  3#  Neutral  3#      Band ER Green 3 x 15     Triceps pressdown 10# 3 x 10                                              HEP: Added band-resisted biceps curls to home exercise program with green band. Patient verbalized understanding. Treatment/Session Summary:    · Response to Treatment:  Good motion with R shoulder and elbow. Limited use of R thumb. · Communication/Consultation:  None today  · Equipment provided today:  None today  · Recommendations/Intent for next treatment session: Next visit will focus on improving R elbow ROM. Treatment Plan of Care Effective Dates:  7/8/19 to 9/30/2019. Frequency/Duration: 2-3 visits per week for 12 weeks.         Total Treatment Billable Duration:  40 minutes   PT Patient Time In/Time Out  Time In: 1600  Time Out: [address], PT    Future Appointments   Date Time Provider Lizett Crystali   7/29/2019  4:45 PM Melodie Simon., PT [facility]   7/31/2019  8:15 AM Gabriela See., PT [facility]

## 2019-07-31 ENCOUNTER — HOSPITAL ENCOUNTER (OUTPATIENT)
Dept: PHYSICAL THERAPY | Age: 49
Discharge: HOME | End: 2019-07-31
Payer: COMMERCIAL

## 2019-07-31 PROCEDURE — 97140 MANUAL THERAPY 1/> REGIONS: CPT

## 2019-07-31 PROCEDURE — 97110 THERAPEUTIC EXERCISES: CPT

## 2019-07-31 NOTE — PROGRESS NOTES
Krunal Console  : 1970  Payor: [facility] / Plan: [facility] / Product Type: PPO /  [address] at [facility] DIA MCMAHON  1101 [facility], [address] [place] [address] [address], [address] [facility]  [phone]   [phone]       OUTPATIENT PHYSICAL THERAPY: Daily Treatment Note 2019  Visit Count: 7     ICD-10: Treatment Diagnosis: Pain in right elbow (M25.521)  Precautions/Allergies:   Ace inhibitors   MD Orders: Evaluate and treat ; HEP, strengthening, ROM, full ROM/full strength for elbow and hand MEDICAL/REFERRING DIAGNOSIS:  R distal Biceps Repair   DATE OF ONSET: Surgery 19, injury 5/3/19  REFERRING PHYSICIAN: Irma Cee MD  RETURN PHYSICIAN APPOINTMENT: 19       Pre-treatment Symptoms/Complaints:  R thumb remains limited. Shoulder does not hurt today. No complaints of elbow pain. Pain: Initial:  None Post Session:  No VAS   Medications Last Reviewed: 19    Updated Objective Findings:   None     TREATMENT:     MANUAL THERAPY: (15 minutes) to improve R elbow range of motion. Grade 3-4 R elbow mobilizations to improve supination of R forearm. Scar mobilization to incision at R distal biceps. Soft tissue mobilization to humeral insertion to R pec. THERAPEUTIC EXERCISE: (30 minutes) per grid below to improve R UE strength and function. Passive ranging to R shoulder for flexion, abduction, external and internal rotation, and R elbow flexion and extension. Date:  19 Date:  19 Date:     Activity/Exercise Parameters Parameters Parameters   Supine press  REclined  3#   5#      Bicep curls Supinated  2#  3#  Neutral  3#  Supinated  5# 2 x 15  6# 1 x 12    Band ER Green 3 x 15 Green 3 x 15     Triceps pressdown 10# 3 x 10  10# 3 x 10     Scapular retractions  Unilateral cable row  10# 3 x 10 R    Deltoid strengthening  Front raise  3# 3 x 12  Lateral raise  3# 3 x 12                                HEP: No changes to HEP today.  Patient is to continue with current HEP. Treatment/Session Summary:    · Response to Treatment:  Shoulder and elbow motion is good. Supination ROM improving. · Communication/Consultation:  None today  · Equipment provided today:  None today  · Recommendations/Intent for next treatment session: Next visit will focus on improving R elbow ROM. Treatment Plan of Care Effective Dates:  7/8/19 to 9/30/2019. Frequency/Duration: 2-3 visits per week for 12 weeks.         Total Treatment Billable Duration:  45 minutes   PT Patient Time In/Time Out  Time In: 0815  Time Out: 0900    London Jimenes, PT    Future Appointments   Date Time Provider Lizett De Souza   8/2/2019 11:15 AM Maryla Congress, PT [facility]   8/5/2019  8:15 AM Pam Aures., PT [facility]   8/7/2019  8:15 AM Pam Aures., PT [facility]   8/9/2019 10:15 AM Pam Aures., PT [facility]   8/12/2019  4:00 PM Pam Aures., PT [facility]   8/13/2019  3:30 PM Ruperto Madrid, PT [facility]   8/16/2019 11:15 AM [facility] Congress, PT [facility]   8/19/2019  1:45 PM Ruperto Madrid, PT [facility]   8/20/2019  8:15 AM Ruperto Madrid, PT [facility]   8/23/2019  1:15 PM [facility] Congress, PT [facility]

## 2019-08-02 ENCOUNTER — HOSPITAL ENCOUNTER (OUTPATIENT)
Dept: PHYSICAL THERAPY | Age: 49
Discharge: HOME | End: 2019-08-02
Payer: COMMERCIAL

## 2019-08-02 PROCEDURE — 97140 MANUAL THERAPY 1/> REGIONS: CPT

## 2019-08-02 PROCEDURE — 97110 THERAPEUTIC EXERCISES: CPT

## 2019-08-02 NOTE — PROGRESS NOTES
Quirino Overton  : 1970  Payor: [facility] / Plan: [facility] / Product Type: PPO /  [address] at [facility] DIA MCMAHON  1101 [facility], [address] [place] [address],[address], [facility] [address].  [phone]   [phone]       OUTPATIENT PHYSICAL THERAPY: Daily Treatment Note 2019  Visit Count: 8     ICD-10: Treatment Diagnosis: Pain in right elbow (M25.521)  Precautions/Allergies:   Ace inhibitors   MD Orders: Evaluate and treat ; HEP, strengthening, ROM, full ROM/full strength for elbow and hand MEDICAL/REFERRING DIAGNOSIS:  R distal Biceps Repair   DATE OF ONSET: Surgery 19, injury 5/3/19  REFERRING PHYSICIAN: Lex Michaels MD  RETURN PHYSICIAN APPOINTMENT: 19       Pre-treatment Symptoms/Complaints:  Ethyl Brought was a little sore but did not take med. Today better. Pain: Initial:  0/10 Post Session:  No VAS   Medications Last Reviewed: 19    Updated Objective Findings: R shoulder ABD with palm down = 90 degrees, after session = 105 degrees     TREATMENT:     MANUAL THERAPY: (20 minutes) to improve R elbow range of motion. Grade 3-4 R elbow mobilizations to improve supination of R forearm. Scar mobilization to incision at R distal biceps. Soft tissue mobilization to forearm; R GH inferior glide grade 3 to 4    THERAPEUTIC EXERCISE: (25 minutes) per grid below to improve R UE strength and function. Started session with UBE at level 3 for 6 min. ALso did sleeper stretch in standing for R GH posterior cuff stretch. Passive ranging to  R elbow flexion,  extension and supination.         Date:  19 Date:  19 Date:  19   Activity/Exercise Parameters Parameters Parameters   Supine press  REclined  3#   5#   -   Bicep curls Supinated  2#  3#  Neutral  3#  Supinated  5# 2 x 15  6# 1 x 12 Supinated  6#2x12   Band ER Green 3 x 15 Green 3 x 15  -   Triceps pressdown 10# 3 x 10  10# 3 x 10  13# 3x10   Scapular retractions  Unilateral cable row  10# 3 x 10 R D1  Cable 10# 3x10   Deltoid strengthening  Front raise  3# 3 x 12  Lateral raise  3# 3 x 12 Front raise  4# 3 x 12  Lateral raise  4# 3 x 12   Ball drop   500 g  2x10   supination   2# 30x   Mid trap   3# 3x12   Sidelying ER   3# 2x 10                         HEP:  Patient is to continue with current HEP with additions to thumb, sleeper stretch and updated weights. He also may work on D1 with theraband. Agreed to do one session prior to returning to PT. .    Treatment/Session Summary:    · Response to Treatment:  Stiff to R inferior and posterior GH capsule still. Has less pinch at anterior shoulder with HORZ ABD when this is addressed. ELbow tolerating levels of strengthening with some post work out soreness the next day. Thumb still weak to ABD and EXT. Assignted eccentric work on these motions to pt. · Communication/Consultation:  None today  · Equipment provided today:  None today  · Recommendations/Intent for next treatment session: Next visit will focus on improving R elbow ROM. Treatment Plan of Care Effective Dates:  7/8/19 to 9/30/2019. Frequency/Duration: 2-3 visits per week for 12 weeks.         Total Treatment Billable Duration:  45 minutes   PT Patient Time In/Time Out  Time In: 1120  Time Out: [address], PT    Future Appointments   Date Time Provider Lizett De Souza   8/5/2019  8:15 AM Ricki Germain, PT [facility]   8/7/2019  8:15 AM Ricki Germain, PT [facility]   8/9/2019 10:15 AM Ricki Germain, PT [facility]   8/12/2019  4:00 PM Ricki Germain, PT [facility]   8/13/2019  3:30 PM Dima Montejo, PT [facility]   8/16/2019 11:15 AM Nicky Key, PT [facility]   8/19/2019  1:45 PM Dima Montejo, PT [facility]   8/20/2019  8:15 AM Dima Montejo, PT [facility]   8/23/2019  1:15 PM Nicky Key, PT [facility]

## 2019-08-05 ENCOUNTER — HOSPITAL ENCOUNTER (OUTPATIENT)
Dept: PHYSICAL THERAPY | Age: 49
Discharge: HOME | End: 2019-08-05
Payer: COMMERCIAL

## 2019-08-05 PROCEDURE — 97110 THERAPEUTIC EXERCISES: CPT

## 2019-08-05 PROCEDURE — 97140 MANUAL THERAPY 1/> REGIONS: CPT

## 2019-08-05 NOTE — PROGRESS NOTES
Whitaker Post  : 1970  Payor: [facility] / Plan: [facility] / Product Type: PPO /  [address] at [facility] DIA MCMAHON  1101 [facility], [address] [place] [address],[address], [address] [facility]  [phone]   [phone]       OUTPATIENT PHYSICAL THERAPY: Daily Treatment Note 2019  Visit Count: 9     ICD-10: Treatment Diagnosis: Pain in right elbow (M25.521)  Precautions/Allergies:   Ace inhibitors   MD Orders: Evaluate and treat ; HEP, strengthening, ROM, full ROM/full strength for elbow and hand MEDICAL/REFERRING DIAGNOSIS:  R distal Biceps Repair   DATE OF ONSET: Surgery 19, injury 5/3/19  REFERRING PHYSICIAN: Eufemia Rodriguez MD  RETURN PHYSICIAN APPOINTMENT: 19       Pre-treatment Symptoms/Complaints:  Pt reports that he just has some elbow stiffness today. Has had some R shoulder discomfort when reaching to the side. Pain: Initial:  0/10 Post Session:  No VAS   Medications Last Reviewed: 19    Updated Objective Findings: None today     TREATMENT:     MANUAL THERAPY: (25 minutes) to improve R elbow range of motion. Grade 3-4 accessory R glenohumeral inferior and posterior glides to improve R shoulder motion. R elbow flexion/extension and supination physio mobilizations to improve R elbow motion. Grade 3-4 physio mobilizations to R shoulder for flexion, abduction, internal and external rotations. THERAPEUTIC EXERCISE: (20 minutes) per grid below to improve R UE strength and function. Patient required occasional cueing to perform exercises with appropriate form.        Date:  19 Date:  19 Date:  19 Date  19   Activity/Exercise Parameters Parameters Parameters    Supine press  REclined  3#   5#   -    Bicep curls Supinated  2#  3#  Neutral  3#  Supinated  5# 2 x 15  6# 1 x 12 Supinated  6#2x12 Supinated   6# x 12  8# 2 x 10   Band ER Green 3 x 15 Green 3 x 15  - Blue, single band, 3 x 10 R   Triceps pressdown 10# 3 x 10  10# 3 x 10  13# 3x10 13# 3 x 10 Scapular retractions  Unilateral cable row  10# 3 x 10 R D1  Cable 10# 3x10 Unilateral cable row  20# 3 x 10 R   Deltoid strengthening  Front raise  3# 3 x 12  Lateral raise  3# 3 x 12 Front raise  4# 3 x 12  Lateral raise  4# 3 x 12 Front raise  4# 3 x 12    Lateral raise  4# 3 x 12   Ball drop   500 g  2x10    supination   2# 30x    Mid trap   3# 3x12  2# 2 x 12  3# x 12   Sidelying ER   3# 2x 10 3# 3 x 12   Lower trapezius    0# 3 x 12                          HEP:  Pt is to continue with current HEP. Treatment/Session Summary:    · Response to Treatment:  Reported that he felt less discomfort with prone horizontal abductions than at last treatment. Shoulder motion progressing. Thumb extension continues to be limited. · Communication/Consultation:  None today  · Equipment provided today:  None today  · Recommendations/Intent for next treatment session: Next visit will focus on improving R elbow ROM. Treatment Plan of Care Effective Dates:  7/8/19 to 9/30/2019. Frequency/Duration: 2-3 visits per week for 12 weeks.         Total Treatment Billable Duration:  45 minutes   PT Patient Time In/Time Out  Time In: 0815  Time Out: 0900    Kandace Weber, PT

## 2019-08-07 ENCOUNTER — HOSPITAL ENCOUNTER (OUTPATIENT)
Dept: PHYSICAL THERAPY | Age: 49
Discharge: HOME | End: 2019-08-07
Payer: COMMERCIAL

## 2019-08-07 PROCEDURE — 97140 MANUAL THERAPY 1/> REGIONS: CPT

## 2019-08-07 PROCEDURE — 97110 THERAPEUTIC EXERCISES: CPT

## 2019-08-07 NOTE — PROGRESS NOTES
Andree Guerrero  : 1970  Payor: [facility] / Plan: [facility] / Product Type: PPO /  [address]  at [facility] DIA MCMAHON  1101 [facility], [address] [place] [address],[address], [facility] [address]  [phone]   [phone]       OUTPATIENT PHYSICAL THERAPY: Daily Treatment Note 2019  Visit Count: 10     ICD-10: Treatment Diagnosis: Pain in right elbow (M25.521)  Precautions/Allergies:   Ace inhibitors   MD Orders: Evaluate and treat ; HEP, strengthening, ROM, full ROM/full strength for elbow and hand MEDICAL/REFERRING DIAGNOSIS:  R distal Biceps Repair   DATE OF ONSET: Surgery 19, injury 5/3/19  REFERRING PHYSICIAN: Kenny Flores MD  RETURN PHYSICIAN APPOINTMENT: 19       Pre-treatment Symptoms/Complaints:  Pt reports that he has R shoulder soreness from sleeping on it. Pain: Initial:  0/10 Post Session:  No VAS   Medications Last Reviewed: 19    Updated Objective Findings: Forward elevation 160 degrees, ER to T2-3, IR to T9-10; R elbow AROM (supine): 10-0-133 degres     TREATMENT:     MANUAL THERAPY: (15 minutes) to improve R elbow range of motion. Grade 3-4 accessory R glenohumeral inferior and posterior glides to improve R shoulder motion. Grade 3-4 physio mobilizations to R shoulder for flexion, abduction, internal and external rotations. THERAPEUTIC EXERCISE: (25 minutes) per grid below to improve R UE strength and function. Patient required occasional cueing to perform exercises with appropriate form.        Date:  19 Date:  19 Date:  19 Date  19 Date  19   Activity/Exercise Parameters Parameters Parameters     Supine press  REclined  3#   5#   -     Bicep curls Supinated  2#  3#  Neutral  3#  Supinated  5# 2 x 15  6# 1 x 12 Supinated  6#2x12 Supinated   6# x 12  8# 2 x 10 Supinated  8# x 10  10# 2 x 10   Band ER Green 3 x 15 Green 3 x 15  - Blue, single band, 3 x 10 R Blue, single band 3 x 10 R   Triceps pressdown 10# 3 x 10  10# 3 x 10  13# 3x10 13# 3 x 10 10# 2 x 10  13# 1 x 10   Scapular retractions  Unilateral cable row  10# 3 x 10 R D1  Cable 10# 3x10 Unilateral cable row  20# 3 x 10 R Bentover row  12# 3 x 12  20# 3 x 10 R   Deltoid strengthening  Front raise  3# 3 x 12  Lateral raise  3# 3 x 12 Front raise  4# 3 x 12  Lateral raise  4# 3 x 12 Front raise  4# 3 x 12    Lateral raise  4# 3 x 12 Front raise  4# 3 x 12    Lateral raise  4# 3 x 12   Ball drop   500 g  2x10     supination   2# 30x     Mid trap   3# 3x12  2# 2 x 12  3# x 12 3# 3 x 12   Sidelying ER   3# 2x 10 3# 3 x 12 -   Lower trapezius    0# 3 x 12  2# 3 x 12                         HEP:  Pt is to continue with current HEP. Treatment/Session Summary:    · Response to Treatment:  Good performance with shoulder and elbow exercises today. No pain with middle or lower trap raises. Continues to have limited R thumb motion. · Communication/Consultation:  None today  · Equipment provided today:  None today  · Recommendations/Intent for next treatment session: Next visit will focus on improving R elbow ROM. Treatment Plan of Care Effective Dates:  7/8/19 to 9/30/2019. Frequency/Duration: 2-3 visits per week for 12 weeks.         Total Treatment Billable Duration:  40 minutes   PT Patient Time In/Time Out  Time In: 0815  Time Out: 0900    Horace Ku PT

## 2019-08-07 NOTE — PROGRESS NOTES
Harrison Lee  : 1970  Primary: Vickie Perez [facility]  Secondary:  [address]  at [facility] DIA MCMAHON  1101 [facility], [address] [place] [address],[address], [facility] [address].  [phone]   [phone]          OUTPATIENT PHYSICAL THERAPY:Progress Report 2019   ICD-10: Treatment Diagnosis: Pain in right elbow (M25.521)  Precautions/Allergies:   Ace inhibitors   TREATMENT PLAN:  Effective Dates: 2019 TO 2019. Frequency/Duration: 2-3 days per week for 12 weeks MEDICAL/REFERRING DIAGNOSIS:  R distal Biceps Repair   DATE OF ONSET: Surgery 19; injury May 3, 2019  REFERRING PHYSICIAN: Luiza Alvares MD MD Orders: Evaluate and treat ; HEP, strengthening, ROM, full ROM/full strength for elbow and hand  Return MD Appointment: TBD     PROGRESS ASSESSMENT:  Mr. Zigmund Sandhoff presents s/p R open distal biceps repair with two anchors. Patient has made excellent progress with R elbow range of motion and is able to tolerate resisted exercises without discomfort. Patient remains limited by L thumb weakness with limited ability to perform active thumb extension. Patient is likely to benefit from continued PT for improvements with R UE function. PROBLEM LIST (Impacting functional limitations):  1. Decreased Strength  2. Increased Pain  3. Decreased Activity Tolerance  4. Decreased Flexibility/Joint Mobility  5. Decreased [place] with Home Exercise Program INTERVENTIONS PLANNED: (Treatment may consist of any combination of the following)  1. Home Exercise Program (HEP)  2. Manual Therapy  3. Range of Motion (ROM)  4. Therapeutic Exercise/Strengthening  5. Ultrasound (US)     GOALS: (Goals have been discussed and agreed upon with patient.)  Short-Term Functional Goals: Time Frame: 6 weeks  1. Patient will report 1-2/10 R elbow pain and improve score on DASH to <50% MET 19  2. Patient will be able to achieve 5-0-132 degrees of R elbow PROM MET 19  3.  Patient be independent with HEP MET 8-7-19  Discharge Goals: Time Frame: 12 weeks  1. Patient will report 1-2/10 R elbow pain at worst and improve score on DASH to <25%   2. Patient will be able to achieve 5-0-132 degrees of R elbow AROM  3. Patient will have 5/5 R elbow flexion and extension strength  4. Patient will be independent with HEP    OUTCOME MEASURE:   Tool Used: Disabilities of the Arm, Shoulder and Hand (DASH) Questionnaire - Quick Version  Score:  Initial: 45/55 or 77% limited (7-9-19) Most Recent: 21/55 or 23% limited (Date: 8-7-19 )   Interpretation of Score: The DASH is designed to measure the activities of daily living in person's with upper extremity dysfunction or pain. Each section is scored on a 1-5 scale, 5 representing the greatest disability. The scores of each section are added together for a total score of 55. MEDICAL NECESSITY:   · Skilled intervention continues to be required due to reduced R elbow ROM, increased R elbow pain, and reduced R UE strength. REASON FOR SERVICES/OTHER COMMENTS:  · Patient continues to require skilled intervention due to reduced R elbow function, strength and motion. .    Rehabilitation Potential For Stated Goals: Good         EXAMINATION:   8-7-19    Observation/Orthostatic Postural Assessment:          Patient arrived to PT in no apparent distress  Palpation:          Unremarkable  ROM:          ROM:                  R elbow AROM: 10-0-133 degrees   R shoulder AROM: 160 degrees of forward elevation, Active ER (reaching) T2-3, active IR (reaching) T9-10                   Strength:            R elbow not formally tested via manual muscle testing. Able to perform 12 repetitions of bicep curls with 12 lbs dumbbell without pain. Neurological Screen:   Reduced sensation to R forearm along radial nerve distribution. Body Structures Involved:  1. Joints  2. Muscles  3. Ligaments Body Functions Affected:  1. Sensory/Pain  2. Neuromusculoskeletal  3. Movement Related  4.  Skin Related Activities and Participation Affected:  1. General Tasks and Demands  2. Self Care  3.  Domestic Life

## 2019-08-09 ENCOUNTER — HOSPITAL ENCOUNTER (OUTPATIENT)
Dept: PHYSICAL THERAPY | Age: 49
Discharge: HOME | End: 2019-08-09
Payer: COMMERCIAL

## 2019-08-09 PROCEDURE — 97110 THERAPEUTIC EXERCISES: CPT

## 2019-08-09 PROCEDURE — 97140 MANUAL THERAPY 1/> REGIONS: CPT

## 2019-08-09 NOTE — PROGRESS NOTES
Lorraine Toure  : 1970  Payor: [facility] / Plan: [facility] / Product Type: PPO /  [address] at [facility] DIA MCMAHON  1101 [facility], [address] [place] [address],[address], [facility] [address]  [phone]   [phone]       OUTPATIENT PHYSICAL THERAPY: Daily Treatment Note 2019  Visit Count: 11     ICD-10: Treatment Diagnosis: Pain in right elbow (M25.521)  Precautions/Allergies:   Ace inhibitors   MD Orders: Evaluate and treat ; HEP, strengthening, ROM, full ROM/full strength for elbow and hand MEDICAL/REFERRING DIAGNOSIS:  R distal Biceps Repair   DATE OF ONSET: Surgery 19, injury 5/3/19  REFERRING PHYSICIAN: Aparna Duke MD  RETURN PHYSICIAN APPOINTMENT: 19       Pre-treatment Symptoms/Complaints:  Thumb function continues to be limited. Elbow feels a little stiff but not painful. Pain: Initial:  0/10 Post Session:  No VAS   Medications Last Reviewed: 19    Updated Objective Findings: None     TREATMENT:     MANUAL THERAPY: (10 minutes) to improve R elbow range of motion. Grade 3-4 accessory R glenohumeral inferior and posterior glides to improve R shoulder motion. Grade 3-4 physio mobilizations to R shoulder for flexion, abduction, internal and external rotations. THERAPEUTIC EXERCISE: (30 minutes) per grid below to improve R UE strength and function. Patient required occasional cueing to perform exercises with appropriate form.        Date:  19 Date:  19 Date:  19 Date  19 Date  19 Date  19   Activity/Exercise Parameters Parameters Parameters      Supine press  REclined  3#   5#   -      Bicep curls Supinated  2#  3#  Neutral  3#  Supinated  5# 2 x 15  6# 1 x 12 Supinated  6#2x12 Supinated   6# x 12  8# 2 x 10 Supinated  8# x 10  10# 2 x 10 Supinated  10# 3 x 12   Band ER Green 3 x 15 Green 3 x 15  - Blue, single band, 3 x 10 R Blue, single band 3 x 10 R Blue single band  3 x 10 R   Triceps pressdown 10# 3 x 10  10# 3 x 10  13# 3x10 13# 3 x 10 10# 2 x 10  13# 1 x 10 13# 3 x 12 R   Scapular retractions  Unilateral cable row  10# 3 x 10 R D1  Cable 10# 3x10 Unilateral cable row  20# 3 x 10 R Bentover row  12# 3 x 12  20# 3 x 10 R    Deltoid strengthening  Front raise  3# 3 x 12  Lateral raise  3# 3 x 12 Front raise  4# 3 x 12  Lateral raise  4# 3 x 12 Front raise  4# 3 x 12    Lateral raise  4# 3 x 12 Front raise  4# 3 x 12    Lateral raise  4# 3 x 12 Front raise  5# 3 x 12    Lateral raise  5# 3 x 12   Ball drop   500 g  2x10      supination   2# 30x      Mid trap   3# 3x12  2# 2 x 12  3# x 12 3# 3 x 12 3# 3 x 12   Sidelying ER   3# 2x 10 3# 3 x 12 -    Lower trapezius    0# 3 x 12  2# 3 x 12 2# 3 x 12   Loaded carries      Farmer's walk, R UE 75' 3 x 3                          HEP:  Pt is to continue with current HEP. Treatment/Session Summary:    · Response to Treatment:  R UE fatigued following loaded carries. Thumb function relatively unchanged. Good R UE shoulder and elbow range of motion and able to tolerate increased resistance without increased biceps pain. · Communication/Consultation:  None today  · Equipment provided today:  None today  · Recommendations/Intent for next treatment session: Next visit will focus on improving R elbow ROM. Treatment Plan of Care Effective Dates:  7/8/19 to 9/30/2019. Frequency/Duration: 2-3 visits per week for 12 weeks.         Total Treatment Billable Duration:  40 minutes   PT Patient Time In/Time Out  Time In: 1030  Time Out: 1894 Reddy Poe, PT

## 2019-08-12 ENCOUNTER — HOSPITAL ENCOUNTER (OUTPATIENT)
Dept: PHYSICAL THERAPY | Age: 49
Discharge: HOME | End: 2019-08-12
Payer: COMMERCIAL

## 2019-08-12 PROCEDURE — 97140 MANUAL THERAPY 1/> REGIONS: CPT

## 2019-08-12 PROCEDURE — 97110 THERAPEUTIC EXERCISES: CPT

## 2019-08-12 NOTE — PROGRESS NOTES
Shyam Show  : 1970  Payor: [facility] / Plan: [address] / Product Type: PPO /  [address] at [facility] DIA MCMAHON  1101 [facility], [address] [place] [address],[address], [facility] [address].  [phone]   [phone]       OUTPATIENT PHYSICAL THERAPY: Daily Treatment Note 2019  Visit Count: 12     ICD-10: Treatment Diagnosis: Pain in right elbow (M25.521)  Precautions/Allergies:   Ace inhibitors   MD Orders: Evaluate and treat ; HEP, strengthening, ROM, full ROM/full strength for elbow and hand MEDICAL/REFERRING DIAGNOSIS:  R distal Biceps Repair   DATE OF ONSET: Surgery 19, injury 5/3/19  REFERRING PHYSICIAN: Polly Campos MD  RETURN PHYSICIAN APPOINTMENT: 19       Pre-treatment Symptoms/Complaints:  R upper extremity was sore most of the day on . Unsure why but he had soreness down the majority of his R arm on . Pain: Initial:  No VAS; Post Session:  No VAS   Medications Last Reviewed: 19    Updated Objective Findings: None     TREATMENT:     MANUAL THERAPY: (10 minutes) to improve R elbow range of motion. Grade 3-4 accessory R glenohumeral inferior and posterior glides to improve R shoulder motion. Grade 3-4 physio mobilizations to R shoulder for flexion, abduction, internal and external rotations. THERAPEUTIC EXERCISE: (30 minutes) per grid below to improve R UE strength and function. Patient required occasional cueing to perform exercises with appropriate form. 5 minute warm up on upper body ergometer.        Date:  19 Date:  19 Date:  19 Date  19 Date  19 Date  19 Date  19   Activity/Exercise Parameters Parameters Parameters       Supine press  REclined  3#   5#   -       Bicep curls Supinated  2#  3#  Neutral  3#  Supinated  5# 2 x 15  6# 1 x 12 Supinated  6#2x12 Supinated   6# x 12  8# 2 x 10 Supinated  8# x 10  10# 2 x 10 Supinated  10# 3 x 12 Supinated  10# x 12  12# 2 x 12   Band ER Green 3 x 15 Green 3 x 15  - Blue, single band, 3 x 10 R Blue, single band 3 x 10 R Blue single band  3 x 10 R Blue single band  3 x 10 R   Triceps pressdown 10# 3 x 10  10# 3 x 10  13# 3x10 13# 3 x 10 10# 2 x 10  13# 1 x 10 13# 3 x 12 R 13# 3 x 12 R   Scapular retractions  Unilateral cable row  10# 3 x 10 R D1  Cable 10# 3x10 Unilateral cable row  20# 3 x 10 R Bentover row  12# 3 x 12  20# 3 x 10 R  Single arm cable row  17# 3 x 10 R   Deltoid strengthening  Front raise  3# 3 x 12  Lateral raise  3# 3 x 12 Front raise  4# 3 x 12  Lateral raise  4# 3 x 12 Front raise  4# 3 x 12    Lateral raise  4# 3 x 12 Front raise  4# 3 x 12    Lateral raise  4# 3 x 12 Front raise  5# 3 x 12    Lateral raise  5# 3 x 12 Front raises  5# 3 x 12 R     Lateral raises  5# 3 x 12 R   Ball drop   500 g  2x10       supination   2# 30x       Mid trap   3# 3x12  2# 2 x 12  3# x 12 3# 3 x 12 3# 3 x 12 3# 3 x 12   Sidelying ER   3# 2x 10 3# 3 x 12 -     Lower trapezius    0# 3 x 12  2# 3 x 12 2# 3 x 12 2# 3 x 12   Loaded carries      Farmer's walk, R UE 75' 3 x 3  Farmer's walk 15#  R UE, 75' 2 x 3 laps                         HEP:  Pt is to continue with current HEP. Treatment/Session Summary:    · Response to Treatment:  Good performance of therapeutic exercises today. Minimal discomfort with any strengthening exercise. Some R shoulder stiffness exhibited. · Communication/Consultation:  None today  · Equipment provided today:  None today  · Recommendations/Intent for next treatment session: Next visit will focus on improving R elbow ROM. Treatment Plan of Care Effective Dates:  7/8/19 to 9/30/2019. Frequency/Duration: 2-3 visits per week for 12 weeks.         Total Treatment Billable Duration:  40 minutes   PT Patient Time In/Time Out  Time In: 1600  Time Out: [address], PT

## 2019-08-13 ENCOUNTER — HOSPITAL ENCOUNTER (OUTPATIENT)
Dept: PHYSICAL THERAPY | Age: 49
Discharge: HOME | End: 2019-08-13
Payer: COMMERCIAL

## 2019-08-13 PROCEDURE — 97140 MANUAL THERAPY 1/> REGIONS: CPT

## 2019-08-13 NOTE — PROGRESS NOTES
Quirino Overton  : 1970  Payor: [facility] / Plan: [facility] / Product Type: PPO /  [address] at [facility] DIA MCMAHON  [facility], [address], [address] [facility]  [phone]   [phone]       OUTPATIENT PHYSICAL THERAPY: Daily Treatment Note 2019  Visit Count: 13     ICD-10: Treatment Diagnosis: Pain in right elbow (M25.521)  Precautions/Allergies:   Ace inhibitors   MD Orders: Evaluate and treat; HEP, strengthening, ROM, full ROM/full strength elbow and hand motion 3x/wk for 1 month (written 19) MEDICAL/REFERRING DIAGNOSIS:  R distal Biceps Repair   DATE OF ONSET: Surgery 19, injury 5/3/19  REFERRING PHYSICIAN: Lex Michaels MD  RETURN PHYSICIAN APPOINTMENT: 19       Pre-treatment Symptoms/Complaints:  Reports he is doing okay. No new problems. Thumb still doesn't work correctly. Pain: Initial: Pain Intensity 1: 0   Post Session:  No change   Medications Last Reviewed: 19    Updated Objective Findings:  B elbow flex appears to be about equal.  Slightly decreased extn R elbow. Pronation appears about the same, but supination on R appears decreased initially. TREATMENT:     MANUAL THERAPY: (30 minutes) to improve R UE range of motion. Grade 2 to 4 physio mobs R elbow flex and extn, R forearm pronation and supination, R wrist flex and extn, and R thumb flex and extn. Mobilization of scar tissue at R elbow.       THERAPEUTIC EXERCISE: (  minutes) none today   Date:  19 Date  19 Date  19 Date  19 Date  19   Activity/Exercise Parameters       Supine press  -       Bicep curls Supinated  6#2x12 Supinated   6# x 12  8# 2 x 10 Supinated  8# x 10  10# 2 x 10 Supinated  10# 3 x 12 Supinated  10# x 12  12# 2 x 12   Band ER - Blue, single band, 3 x 10 R Blue, single band 3 x 10 R Blue single band  3 x 10 R Blue single band  3 x 10 R   Triceps pressdown 13# 3x10 13# 3 x 10 10# 2 x 10  13# 1 x 10 13# 3 x 12 R 13# 3 x 12 R   Scapular retractions D1  Cable 10# 3x10 Unilateral cable row  20# 3 x 10 R Bentover row  12# 3 x 12  20# 3 x 10 R  Single arm cable row  17# 3 x 10 R   Deltoid strengthening Front raise  4# 3 x 12  Lateral raise  4# 3 x 12 Front raise  4# 3 x 12    Lateral raise  4# 3 x 12 Front raise  4# 3 x 12    Lateral raise  4# 3 x 12 Front raise  5# 3 x 12    Lateral raise  5# 3 x 12 Front raises  5# 3 x 12 R     Lateral raises  5# 3 x 12 R   Ball drop 500 g  2x10       supination 2# 30x       Mid trap 3# 3x12  2# 2 x 12  3# x 12 3# 3 x 12 3# 3 x 12 3# 3 x 12   Sidelying ER 3# 2x 10 3# 3 x 12 -     Lower trapezius  0# 3 x 12  2# 3 x 12 2# 3 x 12 2# 3 x 12   Loaded carries    Farmer's walk, R UE 75' 3 x 3  Farmer's walk 15#  R UE, 75' 2 x 3 laps                         HEP:  Pt is to continue with current HEP. Treatment/Session Summary:    · Response to Treatment:  Focused on motion today since patient had done strengthening yesterday. By end of session ROM of R elbow and forearm appeared roughly equal to L. No increased pain noted from treatment today. Still has difficulty extending R thumb actively. · Communication/Consultation:  None today  · Equipment provided today:  None today  · Recommendations/Intent for next treatment session: Next visit will focus on resuming strengthening of R UE. Treatment Plan of Care Effective Dates:  7/8/19 to 9/30/2019. Frequency/Duration: 2-3 visits per week for 12 weeks.         Total Treatment Billable Duration:  30 minutes   PT Patient Time In/Time Out  Time In: 1109  Time Out: 2500 [facility] Charlee Marychuy

## 2019-08-16 ENCOUNTER — HOSPITAL ENCOUNTER (OUTPATIENT)
Dept: PHYSICAL THERAPY | Age: 49
Discharge: HOME | End: 2019-08-16
Payer: COMMERCIAL

## 2019-08-16 PROCEDURE — 97110 THERAPEUTIC EXERCISES: CPT

## 2019-08-16 PROCEDURE — 97140 MANUAL THERAPY 1/> REGIONS: CPT

## 2019-08-16 NOTE — PROGRESS NOTES
Cordella Pouch  : 1970  Payor: [employer] / Plan: [address] / Product Type: PPO /  [address] at [facility] DIA MCMAHON  1101 [facility], [address] [place] [address],[address], [place] [address]  [phone]   [phone]       OUTPATIENT PHYSICAL THERAPY: Daily Treatment Note 2019  Visit Count: 14     ICD-10: Treatment Diagnosis: Pain in right elbow (M25.521)  Precautions/Allergies:   Ace inhibitors   MD Orders: Evaluate and treat; HEP, strengthening, ROM, full ROM/full strength elbow and hand motion 3x/wk for 1 month (written 19) MEDICAL/REFERRING DIAGNOSIS:  R distal Biceps Repair   DATE OF ONSET: Surgery 19, injury 5/3/19  REFERRING PHYSICIAN: Rah Davis MD  RETURN PHYSICIAN APPOINTMENT: 19       Pre-treatment Symptoms/Complaints:  No R elbow pain but R shoulder is tight. Sometimes some twinging at biceps tendon. Pain: Initial:    0/10 in R elbow Post Session:     Medications Last Reviewed: 19    Updated Objective Findings: tight R coraco acromion ligament     TREATMENT:     MANUAL THERAPY: (20 minutes) to improve R UE range of motion. Grade 2 to 4 physio mobs R elbow flex and extn, R forearm pronation and supination. Deep  myofascial release   to R forearm. Mobilization of scar tissue at R elbow. R coraco acromion ligament release.  R [facility] PA mob    THERAPEUTIC EXERCISE: (23  minutes) none today   Date:  19 Date  19 Date  19 Date  19 Date  19 Date  19   Activity/Exercise Parameters        Supine press  -        Bicep curls Supinated  6#2x12 Supinated   6# x 12  8# 2 x 10 Supinated  8# x 10  10# 2 x 10 Supinated  10# 3 x 12 Supinated  10# x 12  12# 2 x 12 Supinated  12# 2 x 12   Band ER - Blue, single band, 3 x 10 R Blue, single band 3 x 10 R Blue single band  3 x 10 R Blue single band  3 x 10 R Blue single band  3 0x R   Triceps pressdown 13# 3x10 13# 3 x 10 10# 2 x 10  13# 1 x 10 13# 3 x 12 R 13# 3 x 12 R 13# 3 x 12 R   Scapular retractions D1  Cable 10# 3x10 Unilateral cable row  20# 3 x 10 R Bentover row  12# 3 x 12  20# 3 x 10 R  Single arm cable row  17# 3 x 10 R Single arm cable row  17# 3 x 10 R   Deltoid strengthening Front raise  4# 3 x 12  Lateral raise  4# 3 x 12 Front raise  4# 3 x 12    Lateral raise  4# 3 x 12 Front raise  4# 3 x 12    Lateral raise  4# 3 x 12 Front raise  5# 3 x 12    Lateral raise  5# 3 x 12 Front raises  5# 3 x 12 R     Lateral raises  5# 3 x 12 R Front raises  5# 2x15 R     Lateral raises  5# 2x15 R   Ball drop 500 g  2x10     1000 grams  3x10  Ball bounce against wall   supination 2# 30x        Mid trap 3# 3x12  2# 2 x 12  3# x 12 3# 3 x 12 3# 3 x 12 3# 3 x 12 3# 3 x 12   Sidelying ER 3# 2x 10 3# 3 x 12 -      Lower trapezius  0# 3 x 12  2# 3 x 12 2# 3 x 12 2# 3 x 12 2# 3 x 12   Loaded carries    Farmer's walk, R UE 75' 3 x 3  Farmer's walk 15#  R UE, 75' 2 x 3 laps Farmer's walk 15#  R UE, 75' 3 x 3 laps   D2 and D1 patterns      D1 cable   13# 10x 2  D2 cable  13# 10x 2                             HEP:  Pt is to continue with current HEP. Treatment/Session Summary:    · Response to Treatment:  By end of session ROM of R elbow and forearm appeared almost equal to L. Does well with strengthening program.  Still had some R short head biceps tendon with lifting 5# forward. Still has difficulty extending R thumb actively. · Communication/Consultation:  None today  · Equipment provided today:  None today  · Recommendations/Intent for next treatment session: Next visit will focus on continuing strengthening of R UE. Treatment Plan of Care Effective Dates:  7/8/19 to 9/30/2019. Frequency/Duration: 2-3 visits per week for 12 weeks.         Total Treatment Billable Duration:  43 minutes   PT Patient Time In/Time Out  Time In: 1120  Time Out: [address], PT

## 2019-08-19 ENCOUNTER — HOSPITAL ENCOUNTER (OUTPATIENT)
Dept: PHYSICAL THERAPY | Age: 49
Discharge: HOME | End: 2019-08-19
Payer: COMMERCIAL

## 2019-08-19 PROCEDURE — 97140 MANUAL THERAPY 1/> REGIONS: CPT

## 2019-08-19 PROCEDURE — 97110 THERAPEUTIC EXERCISES: CPT

## 2019-08-19 NOTE — PROGRESS NOTES
Stephanie Baptiste  : 1970  Payor: [employer] / Plan: [address] / Product Type: PPO /  [address] at [facility] DIA MCMAHON  1101 [facility], [address] [place] [address] 83,[address], [address] [facility]  [phone]   [phone]       OUTPATIENT PHYSICAL THERAPY: Daily Treatment Note 2019  Visit Count: 15     ICD-10: Treatment Diagnosis: Pain in right elbow (M25.521)  Precautions/Allergies:   Ace inhibitors   MD Orders: Evaluate and treat; HEP, strengthening, ROM, full ROM/full strength elbow and hand motion 3x/wk for 1 month (written 19) MEDICAL/REFERRING DIAGNOSIS:  R distal Biceps Repair   DATE OF ONSET: Surgery 19, injury 5/3/19  REFERRING PHYSICIAN: Ryan Mortensen MD  RETURN PHYSICIAN APPOINTMENT: 19       Pre-treatment Symptoms/Complaints:  No R elbow pain but R shoulder is tight. Sometimes some twinging at biceps tendon. Pain: Initial: Pain Intensity 1: 0    Post Session:  None   Medications Last Reviewed: 19    Updated Objective Findings: no new findings     TREATMENT:     MANUAL THERAPY: (15 minutes) to improve R UE range of motion. Grade 2 to 4 physio mobs R elbow flex and extn, R forearm pronation and supination. STM to scar area on R elbow. Therapeutic Exercise: (25 Minutes):  Exercises per grid below to improve strength. Required moderate visual and verbal cues to ensure correct performance. Progressed complexity of movement as indicated.      Date:  8/2/19 Date  8-5-19 Date  8-6-19 Date  19 Date  19 Date  19 Date  19   Activity/Exercise Parameters         Supine B elbow extn  -      3# 1x15  5# 1x15  7# 2x15   Bicep curls Supinated  6#2x12 Supinated   6# x 12  8# 2 x 10 Supinated  8# x 10  10# 2 x 10 Supinated  10# 3 x 12 Supinated  10# x 12  12# 2 x 12 Supinated  12# 2 x 12 7# 1x15  10# 1x15  12# 1x15   Band ER - Blue, single band, 3 x 10 R Blue, single band 3 x 10 R Blue single band  3 x 10 R Blue single band  3 x 10 R Blue single band  3 0x R Blue  R 3x15   Triceps pressdown 13# 3x10 13# 3 x 10 10# 2 x 10  13# 1 x 10 13# 3 x 12 R 13# 3 x 12 R 13# 3 x 12 R 13# R 2x15   Scapular retractions D1  Cable 10# 3x10 Unilateral cable row  20# 3 x 10 R Bentover row  12# 3 x 12  20# 3 x 10 R  Single arm cable row  17# 3 x 10 R Single arm cable row  17# 3 x 10 R Cable row  17#    Deltoid strengthening Front raise  4# 3 x 12  Lateral raise  4# 3 x 12 Front raise  4# 3 x 12    Lateral raise  4# 3 x 12 Front raise  4# 3 x 12    Lateral raise  4# 3 x 12 Front raise  5# 3 x 12    Lateral raise  5# 3 x 12 Front raises  5# 3 x 12 R     Lateral raises  5# 3 x 12 R Front raises  5# 2x15 R     Lateral raises  5# 2x15 R -   Ball drop 500 g  2x10     1000 grams  3x10  Ball bounce against wall -   supination 2# 30x         Mid trap 3# 3x12  2# 2 x 12  3# x 12 3# 3 x 12 3# 3 x 12 3# 3 x 12 3# 3 x 12 3# 3x15   Sidelying ER 3# 2x 10 3# 3 x 12 -    -   Lower trapezius  0# 3 x 12  2# 3 x 12 2# 3 x 12 2# 3 x 12 2# 3 x 12 3# 3x15   Loaded carries    Farmer's walk, R UE 75' 3 x 3  Farmer's walk 15#  R UE, 75' 2 x 3 laps Farmer's walk 15#  R UE, 75' 3 x 3 laps -   D2 and D1 patterns      D1 cable   13# 10x 2  D2 cable  13# 10x 2     D1 and D2 extn with cable 10# 2x15 ea    D1 and D2 flex with cable 7# 2x15 ea   Wall dribble  Flex and ER       0.5 kg  2x 30 ea                         HEP:  Pt is to continue with current HEP. Treatment/Session Summary:    · Response to Treatment:  No pain in R elbow, slight pain in R shoulder and patient still unable to actively extend R thumb all the way. Patient's elbow is doing well, but the nerve damage to thumb is what limits him now. · Communication/Consultation:  None today  · Equipment provided today:  None today  · Recommendations/Intent for next treatment session: Next visit will focus on continuing strengthening of R UE. Treatment Plan of Care Effective Dates:  7/8/19 to 9/30/2019.      Frequency/Duration: 2-3 visits per week for 12 weeks.         Total Treatment Billable Duration:  40 minutes   PT Patient Time In/Time Out  Time In: 3844  Time Out: Naresh Kenney 55 Jovani Levy

## 2019-08-20 ENCOUNTER — APPOINTMENT (OUTPATIENT)
Dept: PHYSICAL THERAPY | Age: 49
End: 2019-08-20
Payer: COMMERCIAL

## 2019-08-29 ENCOUNTER — HOSPITAL ENCOUNTER (OUTPATIENT)
Dept: PHYSICAL THERAPY | Age: 49
Discharge: HOME | End: 2019-08-29
Payer: COMMERCIAL

## 2019-08-29 PROCEDURE — 97140 MANUAL THERAPY 1/> REGIONS: CPT

## 2019-08-29 PROCEDURE — 97110 THERAPEUTIC EXERCISES: CPT

## 2019-08-29 NOTE — PROGRESS NOTES
London Hampton  : 1970  Payor: [facility] / Plan: [facility] / Product Type: PPO /  [address] at [facility] DIA MCMAHON  [facility], [address], [place] [address].  [phone]   [phone]       OUTPATIENT PHYSICAL THERAPY: Daily Treatment Note 2019  Visit Count: 16     ICD-10: Treatment Diagnosis: Pain in right elbow (M25.521)  Precautions/Allergies:   Ace inhibitors   MD Orders: Evaluate and treat; HEP, strengthening, ROM, full ROM/full strength elbow and hand motion 3x/wk for 1 month (written 19) MEDICAL/REFERRING DIAGNOSIS:  R distal Biceps Repair   DATE OF ONSET: Surgery 19, injury 5/3/19  REFERRING PHYSICIAN: Jany Adame MD  RETURN PHYSICIAN APPOINTMENT: 19       Pre-treatment Symptoms/Complaints:  Saw Dr. Joan Garcia and he wants him to continue with therex/PT and he will be sent for NCT on elbow. 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 Some elbow soreness because he was helping his some move. Pain: Initial:     1/10 Post Session:  None   Medications Last Reviewed: 19    Updated Objective Findings:   OUTCOME MEASURE:   Tool Used: Disabilities of the Arm, Shoulder and Hand (DASH) Questionnaire - Quick Version  Score:  Initial: 45/55 or 77% limited (19)   or 23% limited (Date: 19 ) Most Recent:  (date 19)   Interpretation of Score: The DASH is designed to measure the activities of daily living in person's with upper extremity dysfunction or pain. Each section is scored on a 1-5 scale, 5 representing the greatest disability. The scores of each section are added together for a total score of 55.            TREATMENT:     MANUAL THERAPY: (25 minutes) to improve soft tissue mobility: tool assisted soft tissue release using Hawk  to incisional scar, along distal biceps and at distal triceps. Therapeutic Exercise: ( 20 min):  Exercises per grid below to improve strength.   Required moderate visual and verbal cues to ensure correct performance. Progressed repetitions as indicated.      Date  8-6-19 Date  8-9-19 Date  8-12-19 Date  8/16/19 Date  8/19/19 Date  8/23/19 Date  8/29/19   Activity/Exercise          Supine B elbow extn      3# 1x15  5# 1x15  7# 2x15 7# 2x15 7# 2x15   Bicep curls Supinated  8# x 10  10# 2 x 10 Supinated  10# 3 x 12 Supinated  10# x 12  12# 2 x 12 Supinated  12# 2 x 12 7# 1x15  10# 1x15  12# 1x15 12 2x12 12# 2x15   Band ER Blue, single band 3 x 10 R Blue single band  3 x 10 R Blue single band  3 x 10 R Blue single band  3 0x R Blue  R 3x15 Blue  R 3x15 BLUE  STEVEN  3x15   Triceps pressdown 10# 2 x 10  13# 1 x 10 13# 3 x 12 R 13# 3 x 12 R 13# 3 x 12 R 13# R 2x15 13# R 2x15    Scapular retractions Bentover row  12# 3 x 12  20# 3 x 10 R  Single arm cable row  17# 3 x 10 R Single arm cable row  17# 3 x 10 R Cable row  17#  Cable row  17# 2x 15    Deltoid strengthening Front raise  4# 3 x 12    Lateral raise  4# 3 x 12 Front raise  5# 3 x 12    Lateral raise  5# 3 x 12 Front raises  5# 3 x 12 R     Lateral raises  5# 3 x 12 R Front raises  5# 2x15 R     Lateral raises  5# 2x15 R -     Ball drop    1000 grams  3x10  Ball bounce against wall -     supination          Mid trap 3# 3 x 12 3# 3 x 12 3# 3 x 12 3# 3 x 12 3# 3x15 3# 3x15 STEVEN 3# 3x15 STEVEN   Sidelying ER -    -     Lower trapezius 2# 3 x 12 2# 3 x 12 2# 3 x 12 2# 3 x 12 3# 3x15 3# 3x15 R only 3# 3x15 R only   Loaded carries  Farmer's walk, R UE 75' 3 x 3  Farmer's walk 15#  R UE, 75' 2 x 3 laps Farmer's walk 15#  R UE, 75' 3 x 3 laps -     D2 and D1 patterns    D1 cable   13# 10x 2  D2 cable  13# 10x 2     D1 and D2 extn with cable 10# 2x15 ea    D1 and D2 flex with cable 7# 2x15 ea D1 and D2 extn with cable 10# 2x15 ea    D1 and D2 flex with cable 7# 2x15 ea D1 and D2 extn with cable 10# 2x15 ea    D1 and D2 flex with cable 7# 2x15 eavm   Wall dribble  Flex and ER     0.5 kg  2x 30 ea 1000 grams  3 directions x 15 HEP:  Pt is to continue with current HEP. Treatment/Session Summary:    · Response to Treatment:  Had pain to R posterior shoulder with HORZ ABD until corrected for improved use of scap retraction with exercise. Improved soft tissue mobility at incisional scar after manual intervention  · Communication/Consultation:  None today  · Equipment provided today:  None today  · Recommendations/Intent for next treatment session: Next visit will focus on continuing strengthening of R UE. Treatment Plan of Care Effective Dates:  7/8/19 to 9/30/2019. Frequency/Duration: 2-3 visits per week for 12 weeks.         Total Treatment Billable Duration:  45 minutes   PT Patient Time In/Time Out  Time In: 4690  Time Out: [facility], PT

## 2019-09-04 ENCOUNTER — HOSPITAL ENCOUNTER (OUTPATIENT)
Dept: PHYSICAL THERAPY | Age: 49
Discharge: HOME | End: 2019-09-04
Payer: COMMERCIAL

## 2019-09-04 PROCEDURE — 97110 THERAPEUTIC EXERCISES: CPT

## 2019-09-04 PROCEDURE — 97140 MANUAL THERAPY 1/> REGIONS: CPT

## 2019-09-06 ENCOUNTER — HOSPITAL ENCOUNTER (OUTPATIENT)
Dept: PHYSICAL THERAPY | Age: 49
Discharge: HOME | End: 2019-09-06
Payer: COMMERCIAL

## 2019-09-06 PROCEDURE — 97110 THERAPEUTIC EXERCISES: CPT

## 2019-09-06 NOTE — PROGRESS NOTES
Harrison Lee  : 1970  Payor: [facility] / Plan: [place] [facility] / Product Type: PPO /  [place]  at [facility] DIA MCMAHON  [address] [facility], [address] [place] [address],[address], [address] [facility]  [phone]   [phone]       OUTPATIENT PHYSICAL THERAPY: Daily Treatment Note 2019  Visit Count: 18     ICD-10: Treatment Diagnosis: Pain in right elbow (M25.521)  Precautions/Allergies:   Ace inhibitors   MD Orders: Evaluate and treat; HEP, strengthening, ROM, full ROM/full strength   3x/wk for 1 month (written 19) MEDICAL/REFERRING DIAGNOSIS:  R distal Biceps Repair   DATE OF ONSET: Surgery 19, injury 5/3/19  REFERRING PHYSICIAN: Luiza Alvares MD  RETURN PHYSICIAN APPOINTMENT: After MRI and NCS results are in       Pre-treatment Symptoms/Complaints:  Did yard work and had increase in soreness but did OK. Still waiting on nerve conduction study. Pain: Initial:       Post Session:  No significant increase   Medications Last Reviewed: 19    Updated Objective Findings:   None today       TREATMENT:     MANUAL THERAPY: (3 minutes) R GH AP glide for improving posterior capsule mobilty    Therapeutic Exercise: (  38 ):  Exercises per grid below to improve strength. Required moderate visual and verbal cues to ensure correct performance. Progressed   as indicated.      Date  19 Date  19 Date  19 Date  19 Date  19 Date  19 Date  19 Date  19   Activity/Exercise           Supine B elbow extn     3# 1x15  5# 1x15  7# 2x15 7# 2x15 7# 2x15 7# 3x15    Bicep curls Supinated  10# 3 x 12 Supinated  10# x 12  12# 2 x 12 Supinated  12# 2 x 12 7# 1x15  10# 1x15  12# 1x15 12 2x12 12# 2x15 12# B 2x15  10# B 1x15 12# Steven 2x15   Band ER Blue single band  3 x 10 R Blue single band  3 x 10 R Blue single band  3 0x R Blue  R 3x15 Blue  R 3x15 BLUE  STEVEN  3x15 Blue  B 2x15 BLUE in 90/90  2 x 15   Triceps pressdown 13# 3 x 12 R 13# 3 x 12 R 13# 3 x 12 R 13# R 2x15 13# R 2x15  13# R 2x15 STEVEN #17 2x15   Scapular retractions  Single arm cable row  17# 3 x 10 R Single arm cable row  17# 3 x 10 R Cable row  17#  Cable row  17# 2x 15  Cable row  17# 2x15    Deltoid strengthening Front raise  5# 3 x 12    Lateral raise  5# 3 x 12 Front raises  5# 3 x 12 R     Lateral raises  5# 3 x 12 R Front raises  5# 2x15 R     Lateral raises  5# 2x15 R -   -    Ball drop   1000 grams  3x10  Ball bounce against wall -   -    supination       3# 3x15 -   Mid trap 3# 3 x 12 3# 3 x 12 3# 3 x 12 3# 3x15 3# 3x15 STEVEN 3# 3x15 STEVEN 3# R 2x15 4# r 2X 10   Lower trapezius 2# 3 x 12 2# 3 x 12 2# 3 x 12 3# 3x15 3# 3x15 R only 3# 3x15 R only 3# R 2x15 4# r 2X10   Loaded carries Farmer's walk, R UE 75' 3 x 3  Farmer's walk 15#  R UE, 75' 2 x 3 laps Farmer's walk 15#  R UE, 75' 3 x 3 laps -   - Push-pull cart with 86# on it  35 ft ea way x 10   D2 and D1 patterns   D1 cable   13# 10x 2  D2 cable  13# 10x 2     D1 and D2 extn with cable 10# 2x15 ea    D1 and D2 flex with cable 7# 2x15 ea D1 and D2 extn with cable 10# 2x15 ea    D1 and D2 flex with cable 7# 2x15 ea D1 and D2 extn with cable 10# 2x15 ea    D1 and D2 flex with cable 7# 2x15 eavm D1 and D2 extn with cable  10# 2x15 ea    D1 abd D2 flex with cable 7#  2x15 ea D1 and D2 extn with cable  13# 2x15 ea    D1 abd D2 flex with cable 10#  2x15 ea   Wall dribble  Flex and ER    0.5 kg  2x 30 ea 1000 grams  3 directions x 15  - 1000 grams  3 directions  30x ea   Side lie shoulder ABD        3# 10x  5# 10X  7#10X   Rhythmic Stabilzation        ER 1 min  Push up + 1 min  Side lie ABD to 90 1 min   Push up +        Blue  Band  With throwing motion  2x 15                                    HEP:  Pt is to continue with current HEP. Treatment/Session Summary:    · Response to Treatment:  Patient continues to do well with treatment with no significant increase in pain.    · Communication/Consultation:  None today  · Equipment provided today:  None today  · Recommendations/Intent for next treatment session: Next visit will focus on continuing strengthening of R UE. Treatment Plan of Care Effective Dates:  7/8/19 to 9/30/2019. Frequency/Duration: 2-3 visits per week for 12 weeks.         Total Treatment Billable Duration:  41 minutes   PT Patient Time In/Time Out  Time In: 1035  Time Out: [address], PT

## 2019-09-11 ENCOUNTER — HOSPITAL ENCOUNTER (OUTPATIENT)
Dept: PHYSICAL THERAPY | Age: 49
Discharge: HOME | End: 2019-09-11
Payer: COMMERCIAL

## 2019-09-11 PROCEDURE — 97110 THERAPEUTIC EXERCISES: CPT

## 2019-09-11 NOTE — PROGRESS NOTES
Barrera Reyes  : 1970  Primary: Lianne Georges [facility]  Secondary:  [address]  at [facility] DIA MC[place]HON  [address] [facility], [address] [place] [address],[address], [address] [facility]  [phone]   [phone]          OUTPATIENT PHYSICAL THERAPY:Progress Report 2019   ICD-10: Treatment Diagnosis: Pain in right elbow (M25.521)  Precautions/Allergies:   Ace inhibitors   TREATMENT PLAN:  Effective Dates: 2019 TO 2019. Frequency/Duration: 2-3 days per week for 12 weeks  Patient has attended 23 PT sessions from 19 to 19 with no missed sessions MEDICAL/REFERRING DIAGNOSIS:  R distal Biceps Repair   DATE OF ONSET: Surgery 19; injury May 3, 2019  REFERRING PHYSICIAN: Mariela Martínez MD MD Orders: Evaluate and treat ; HEP, strengthening, ROM, full ROM/full strength for elbow and hand  Return MD Appointment: TBD - after MRI and NCS results are back to doctor. PROGRESS ASSESSMENT:  Mr. Austin Dacosta presents s/p R open distal biceps repair with two anchors. Patient has made excellent progress with R elbow range of motion and is able to tolerate resisted exercises without discomfort. Patient remains limited by L thumb weakness with limited ability to perform active thumb extension. He is to return to MD after MRI (has been done) and Nerve Conduction study (to be scheduled) results are back. Patient is likely to benefit from continued PT for improvements with R UE function. PROBLEM LIST (Impacting functional limitations):  1. Decreased Strength  2. Increased Pain  3. Decreased Activity Tolerance  4. Decreased Flexibility/Joint Mobility  5. Decreased [place] with Home Exercise Program INTERVENTIONS PLANNED: (Treatment may consist of any combination of the following)  1. Home Exercise Program (HEP)  2. Manual Therapy  3. Range of Motion (ROM)  4. Therapeutic Exercise/Strengthening  5.  Ultrasound (US)     GOALS: (Goals have been discussed and agreed upon with patient.)  Short-Term Functional Goals: Time Frame: 6 weeks  1. Patient will report 1-2/10 R elbow pain and improve score on DASH to <50% MET 8-7-19  2. Patient will be able to achieve 5-0-132 degrees of R elbow PROM MET 8-7-19  3. Patient be independent with HEP MET 8-7-19  Discharge Goals: Time Frame: 12 weeks  1. Patient will report 1-2/10 R elbow pain at worst and improve score on DASH to <25% - MET 9/11/19  2. Patient will be able to achieve 5-0-132 degrees of R elbow AROM - Mostly MET  3. Patient will have 5/5 R elbow flexion and extension strength - MET  (5/11/19)  4. Patient will be independent with HEP - MET    OUTCOME MEASURE:   Tool Used: Disabilities of the Arm, Shoulder and Hand (DASH) Questionnaire - Quick Version  Score:  Initial: 45/55 or 77% limited (7-9-19) 21/55 or 23% limited (Date: 8-7-19 )     Most Recent: 14/155 (9/11/19)    Interpretation of Score: The DASH is designed to measure the activities of daily living in person's with upper extremity dysfunction or pain. Each section is scored on a 1-5 scale, 5 representing the greatest disability. The scores of each section are added together for a total score of 55. MEDICAL NECESSITY:   · Skilled intervention continues to be required due to reduced R elbow ROM, increased R elbow pain, and reduced R UE strength. REASON FOR SERVICES/OTHER COMMENTS:  · Patient continues to require skilled intervention due to reduced R elbow function, strength and motion. .    Rehabilitation Potential For Stated Goals: Good         EXAMINATION:   9/11/19    Observation/Orthostatic Postural Assessment:          Patient arrived to PT in no apparent distress. Reports no significant pain in R elbow any more, even with activity  Palpation:          Unremarkable  ROM:          ROM:                  R elbow AROM: flex 130, extn 3     R shoulder AROM: flex 162, abduct 145, ER 61, IR T12 on back                   Strength:            R elbow flex 5/5, extn 5/5, pronation 5/5, supinnation 5/5 via MMT. Neurological Screen:   Reduced sensation to R forearm along radial nerve distribution. (previously)    Body Structures Involved:  1. Joints  2. Muscles  3. Ligaments Body Functions Affected:  1. Sensory/Pain  2. Neuromusculoskeletal  3. Movement Related  4. Skin Related Activities and Participation Affected:  1. General Tasks and Demands  2. Self Care  3.  Domestic Life

## 2019-09-11 NOTE — PROGRESS NOTES
Christy Aguiar  : 1970  Payor: [facility] / Plan: [facility] / Product Type: PPO /  [address] at [facility] DIA MCMAHON  [address] [place] [address], [address], [place] [address].  [phone]   [phone]       OUTPATIENT PHYSICAL THERAPY: Daily Treatment Note 2019  Visit Count: 19     ICD-10: Treatment Diagnosis: Pain in right elbow (M25.521)  Precautions/Allergies:   Ace inhibitors   MD Orders: Evaluate and treat; HEP, strengthening, ROM, full ROM/full strength   3x/wk for 1 month (written 19) MEDICAL/REFERRING DIAGNOSIS:  R distal Biceps Repair   DATE OF ONSET: Surgery 19, injury 5/3/19  REFERRING PHYSICIAN: Yudi Turner MD  RETURN PHYSICIAN APPOINTMENT: After MRI and NCS results are in       Pre-treatment Symptoms/Complaints:  Reports he is doing well. No pain in R arm. . Still waiting on nerve conduction study, he called them, but they haven't scheduled it yet. Did a lot of yardwork again over the weekend. .   Pain: Initial: Pain Intensity 1: 0     Post Session:  No increase   Medications Last Reviewed: 19    Updated Objective Findings:   See progress note of today       TREATMENT:     MANUAL THERAPY: (0 minutes) none    Therapeutic Exercise: (40 Minutes   ):  Exercises per grid below to improve strength. Required moderate visual and verbal cues to ensure correct performance. Progressed resistance as indicated.      Date  19 Date  19 Date  19 Date  19 Date  19 Date  19   Activity/Exercise         Supine B elbow extn  3# 1x15  5# 1x15  7# 2x15 7# 2x15 7# 2x15 7# 3x15  8# 3x15   Bicep curls 7# 1x15  10# 1x15  12# 1x15 12 2x12 12# 2x15 12# B 2x15  10# B 1x15 12# Steven 2x15 12# B 3x15   Band ER Blue  R 3x15 Blue  R 3x15 BLUE  STEVEN  3x15 Blue  B 2x15 BLUE in 90/90  2 x 15 Blue 3x15   Triceps pressdown 13# R 2x15 13# R 2x15  13# R 2x15 STEVEN #17 2x15 17# B 3x15   Scapular retractions Cable row  17#  Cable row  17# 2x 15  Cable row  17# 2x15  Cable row  20# 3x15   supination    3# 3x15 - -   Mid trap 3# 3x15 3# 3x15 STEVEN 3# 3x15 STEVEN 3# R 2x15 4# r 2X 10 5# R 2x15   Lower trapezius 3# 3x15 3# 3x15 R only 3# 3x15 R only 3# R 2x15 4# r 2X10 5# R 2x15   Loaded carries -   - Push-pull cart with 86# on it  35 ft ea way x 10 Sled + 38#  X 10 ea way   D2 and D1 patterns D1 and D2 extn with cable 10# 2x15 ea    D1 and D2 flex with cable 7# 2x15 ea D1 and D2 extn with cable 10# 2x15 ea    D1 and D2 flex with cable 7# 2x15 ea D1 and D2 extn with cable 10# 2x15 ea    D1 and D2 flex with cable 7# 2x15 eavm D1 and D2 extn with cable  10# 2x15 ea    D1 abd D2 flex with cable 7#  2x15 ea D1 and D2 extn with cable  13# 2x15 ea    D1 abd D2 flex with cable 10#  2x15 ea D1 and D2  extn with cable  13# 2x15 ea    D1 and D2 flex with cable  10# 2x15 ea   Wall dribble  Flex and ER 0.5 kg  2x 30 ea 1000 grams  3 directions x 15  - 1000 grams  3 directions  30x ea -   Side lie shoulder ABD     3# 10x  5# 10X  7#10X 5# 3x15   Side lying ER      5# 3x15   Rhythmic Stabilzation     ER 1 min  Push up + 1 min  Side lie ABD to 90 1 min -   Push up +     Blue  Band  With throwing motion  2x 15 -                                  HEP:  Pt is to continue with current HEP. Treatment/Session Summary:    · Response to Treatment:  Patient continues to do well with treatment with no increase in pain. See Progress note for full assessment. · Communication/Consultation:  None today  · Equipment provided today:  None today  · Recommendations/Intent for next treatment session: Next visit will focus on continuing strengthening of R UE. Treatment Plan of Care Effective Dates:  7/8/19 to 9/30/2019. Frequency/Duration: 2-3 visits per week for 12 weeks.         Total Treatment Billable Duration:  41 minutes   PT Patient Time In/Time Out  Time In: 0815  Time Out: 0858    Michelle Schmid PT

## 2019-09-13 ENCOUNTER — APPOINTMENT (OUTPATIENT)
Dept: PHYSICAL THERAPY | Age: 49
End: 2019-09-13
Payer: COMMERCIAL

## 2019-10-22 NOTE — PROGRESS NOTES
Krunal Ha  : 1970  Primary: Damaris Gonzales  Secondary:  [address]  at [facility] DIA MCMAHON  [address] [place] [address],  Riley Walker.  [phone]   [phone]          OUTPATIENT PHYSICAL THERAPY:Discontinuation Summary 2019   ICD-10: Treatment Diagnosis: Pain in right elbow (M25.521)  Precautions/Allergies:   Ace inhibitors   TREATMENT PLAN:  Effective Dates: 2019 TO 2019. Frequency/Duration: 2-3 days per week for 12 weeks  Patient has attended 23 PT sessions from 19 to 19 with no missed sessions MEDICAL/REFERRING DIAGNOSIS:  R distal Biceps Repair   DATE OF ONSET: Surgery 19; injury May 3, 2019  REFERRING PHYSICIAN: Irma Cee MD MD Orders: Evaluate and treat ; HEP, strengthening, ROM, full ROM/full strength for elbow and hand  Return MD Appointment: TBD - after MRI and NCS results are back to doctor. Krunal Ha has been seen in physical therapy from 19 to 19 for 19 visits. Treatment has been discontinued at this time due to patient failing to return for additional treatment. The below goals were met prior to discontinuation. Thank you for this referral.          GOALS: (Goals have been discussed and agreed upon with patient.)  Short-Term Functional Goals: Time Frame: 6 weeks  1. Patient will report 1-2/10 R elbow pain and improve score on DASH to <50% MET 19  2. Patient will be able to achieve 5-0-132 degrees of R elbow PROM MET 19  3. Patient be independent with HEP MET 19  Discharge Goals: Time Frame: 12 weeks  1. Patient will report 1-2/10 R elbow pain at worst and improve score on DASH to <25% - MET 19  2. Patient will be able to achieve 5-0-132 degrees of R elbow AROM - Mostly MET  3. Patient will have 5/5 R elbow flexion and extension strength - MET  (19)  4.  Patient will be independent with HEP - MET    OUTCOME MEASURE:   Tool Used: Disabilities of the Arm, Shoulder and Hand (DASH) Questionnaire - Quick Version  Score:  Initial: 45/55 or 77% limited (7-9-19) 21/55 or 23% limited (Date: 8-7-19 )     Most Recent: 14/155 (9/11/19)    Interpretation of Score: The DASH is designed to measure the activities of daily living in person's with upper extremity dysfunction or pain. Each section is scored on a 1-5 scale, 5 representing the greatest disability. The scores of each section are added together for a total score of 55.

## 2019-12-05 ENCOUNTER — HOSPITAL ENCOUNTER (OUTPATIENT)
Dept: SURGERY | Age: 49
Discharge: HOME | End: 2019-12-05

## 2019-12-07 PROBLEM — D17.0 LIPOMA OF NECK: Status: ACTIVE | Noted: 2019-12-07
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
## 2019-12-07 NOTE — BRIEF OP NOTE
BRIEF OPERATIVE NOTE    Date of Procedure: 12/12/2019     Preoperative Diagnosis:  LIPOMA NECK    Postoperative Diagnosis:  SAME    Procedure(s): EXCISION TUMOR NECK \"LIPOMA\"    Surgeon(s) and Role:     * Jennifer Johnson MD - Primary         Assistant Staff:  Yecenia Rapp NP      Surgical Staff:  * No surgical staff found *  No case tracking events are documented in the log. Anesthesia:  GENERAL    Estimated Blood Loss: 5 cc.     Specimens:  7 X 5 CM LIPOMA    Complications: Jaden Orr MD

## 2019-12-09 VITALS — BODY MASS INDEX: 38.65 KG/M2 | WEIGHT: 255 LBS | HEIGHT: 68 IN

## 2019-12-09 NOTE — PERIOP NOTES
Patient verified name and . Order for consent was found in EHR and matches case posting; patient verifies procedure. Type 1B surgery, PAT phone assessment complete. Orders not received. Labs per surgeon: None found in EHR  Labs per anesthesia protocol: K+ to be drawn DOS      Patient answered medical/surgical history questions at their best of ability. All prior to admission medications documented in [facility]. Patient instructed to take the following medications the day of surgery according to anesthesia guidelines with a small sip of water: None . Hold all vitamins 7 days prior to surgery and NSAIDS 5 days prior to surgery. Prescription meds to hold:None    Patient instructed on the following:  Arrive at A Entrance, time of arrival to be called the day before by 1700  NPO after midnight including gum, mints, and ice chips  Responsible adult must drive patient to the hospital, stay during surgery, and patient will need supervision 24 hours after anesthesia  Use antibacterial soap in shower the night before surgery and packet of soap given at Dr. Crook Second office on the morning of surgery  All piercings must be removed prior to arrival.    Leave all valuables (money and jewelry) at home but bring insurance card and ID on       DOS. Do not wear make-up, nail polish, lotions, cologne, perfumes, powders, or oil on skin. Patient teach back successful and patient demonstrates knowledge of instruction.

## 2019-12-11 ENCOUNTER — ANESTHESIA EVENT (OUTPATIENT)
Dept: SURGERY | Age: 49
End: 2019-12-11
Payer: COMMERCIAL

## 2019-12-12 ENCOUNTER — HOSPITAL ENCOUNTER (OUTPATIENT)
Age: 49
Setting detail: OUTPATIENT SURGERY
Discharge: HOME | End: 2019-12-12
Attending: ORTHOPAEDIC SURGERY | Admitting: ORTHOPAEDIC SURGERY
Payer: COMMERCIAL

## 2019-12-12 ENCOUNTER — ANESTHESIA (OUTPATIENT)
Dept: SURGERY | Age: 49
End: 2019-12-12
Payer: COMMERCIAL

## 2019-12-12 VITALS
HEART RATE: 92 BPM | WEIGHT: 260 LBS | RESPIRATION RATE: 20 BRPM | DIASTOLIC BLOOD PRESSURE: 71 MMHG | HEIGHT: 68 IN | SYSTOLIC BLOOD PRESSURE: 125 MMHG | TEMPERATURE: 98 F | BODY MASS INDEX: 39.4 KG/M2 | OXYGEN SATURATION: 96 %

## 2019-12-12 LAB
EST. AVERAGE GLUCOSE BLD GHB EST-MCNC: 114 MG/DL
GLUCOSE BLD STRIP.AUTO-MCNC: 102 MG/DL (ref 65–100)
HBA1C MFR BLD: 5.6 %

## 2019-12-12 PROCEDURE — 77030031139 HC SUT VCRL2 J&J -A: Performed by: ORTHOPAEDIC SURGERY

## 2019-12-12 PROCEDURE — 77030037088 HC TUBE ENDOTRACH ORAL NSL COVD-A: Performed by: ANESTHESIOLOGY

## 2019-12-12 PROCEDURE — 74011000250 HC RX REV CODE- 250: Performed by: NURSE ANESTHETIST, CERTIFIED REGISTERED

## 2019-12-12 PROCEDURE — 76060000033 HC ANESTHESIA 1 TO 1.5 HR: Performed by: ORTHOPAEDIC SURGERY

## 2019-12-12 PROCEDURE — 74011250636 HC RX REV CODE- 250/636: Performed by: ORTHOPAEDIC SURGERY

## 2019-12-12 PROCEDURE — 77030012935 HC DRSG AQUACEL BMS -B: Performed by: ORTHOPAEDIC SURGERY

## 2019-12-12 PROCEDURE — 76010000149 HC OR TIME 1 TO 1.5 HR: Performed by: ORTHOPAEDIC SURGERY

## 2019-12-12 PROCEDURE — 76210000006 HC OR PH I REC 0.5 TO 1 HR: Performed by: ORTHOPAEDIC SURGERY

## 2019-12-12 PROCEDURE — 76210000020 HC REC RM PH II FIRST 0.5 HR: Performed by: ORTHOPAEDIC SURGERY

## 2019-12-12 PROCEDURE — 77030021678 HC GLIDESCP STAT DISP VERT -B: Performed by: ANESTHESIOLOGY

## 2019-12-12 PROCEDURE — 88305 TISSUE EXAM BY PATHOLOGIST: CPT

## 2019-12-12 PROCEDURE — 83036 HEMOGLOBIN GLYCOSYLATED A1C: CPT

## 2019-12-12 PROCEDURE — 77030027138 HC INCENT SPIROMETER -A: Performed by: ORTHOPAEDIC SURGERY

## 2019-12-12 PROCEDURE — 74011250636 HC RX REV CODE- 250/636: Performed by: NURSE ANESTHETIST, CERTIFIED REGISTERED

## 2019-12-12 PROCEDURE — 77030002986 HC SUT PROL J&J -A: Performed by: ORTHOPAEDIC SURGERY

## 2019-12-12 PROCEDURE — 82962 GLUCOSE BLOOD TEST: CPT

## 2019-12-12 PROCEDURE — 77030018836 HC SOL IRR NACL ICUM -A: Performed by: ORTHOPAEDIC SURGERY

## 2019-12-12 PROCEDURE — 74011250637 HC RX REV CODE- 250/637: Performed by: NURSE ANESTHETIST, CERTIFIED REGISTERED

## 2019-12-12 PROCEDURE — 74011250636 HC RX REV CODE- 250/636: Performed by: ANESTHESIOLOGY

## 2019-12-12 PROCEDURE — 74011250636 HC RX REV CODE- 250/636: Performed by: NURSE PRACTITIONER

## 2019-12-12 RX ORDER — DEXAMETHASONE SODIUM PHOSPHATE 4 MG/ML
INJECTION, SOLUTION INTRA-ARTICULAR; INTRALESIONAL; INTRAMUSCULAR; INTRAVENOUS; SOFT TISSUE AS NEEDED
Status: DISCONTINUED | OUTPATIENT
Start: 2019-12-12 | End: 2019-12-12 | Stop reason: HOSPADM

## 2019-12-12 RX ORDER — ALBUTEROL SULFATE 0.83 MG/ML
2.5 SOLUTION RESPIRATORY (INHALATION) AS NEEDED
Status: DISCONTINUED | OUTPATIENT
Start: 2019-12-12 | End: 2019-12-12 | Stop reason: HOSPADM

## 2019-12-12 RX ORDER — EPHEDRINE SULFATE/0.9% NACL/PF 50 MG/5 ML
SYRINGE (ML) INTRAVENOUS AS NEEDED
Status: DISCONTINUED | OUTPATIENT
Start: 2019-12-12 | End: 2019-12-12 | Stop reason: HOSPADM

## 2019-12-12 RX ORDER — MIDAZOLAM HYDROCHLORIDE 1 MG/ML
2 INJECTION, SOLUTION INTRAMUSCULAR; INTRAVENOUS
Status: DISCONTINUED | OUTPATIENT
Start: 2019-12-12 | End: 2019-12-12 | Stop reason: HOSPADM

## 2019-12-12 RX ORDER — OXYCODONE HYDROCHLORIDE 5 MG/1
5 TABLET ORAL
Status: DISCONTINUED | OUTPATIENT
Start: 2019-12-12 | End: 2019-12-12 | Stop reason: HOSPADM

## 2019-12-12 RX ORDER — LIDOCAINE HYDROCHLORIDE 10 MG/ML
0.1 INJECTION INFILTRATION; PERINEURAL AS NEEDED
Status: DISCONTINUED | OUTPATIENT
Start: 2019-12-12 | End: 2019-12-12 | Stop reason: HOSPADM

## 2019-12-12 RX ORDER — SODIUM CHLORIDE, SODIUM LACTATE, POTASSIUM CHLORIDE, CALCIUM CHLORIDE 600; 310; 30; 20 MG/100ML; MG/100ML; MG/100ML; MG/100ML
100 INJECTION, SOLUTION INTRAVENOUS CONTINUOUS
Status: DISCONTINUED | OUTPATIENT
Start: 2019-12-12 | End: 2019-12-12 | Stop reason: HOSPADM

## 2019-12-12 RX ORDER — CEFAZOLIN SODIUM/WATER 2 G/20 ML
2 SYRINGE (ML) INTRAVENOUS ONCE
Status: COMPLETED | OUTPATIENT
Start: 2019-12-12 | End: 2019-12-12

## 2019-12-12 RX ORDER — DIPHENHYDRAMINE HYDROCHLORIDE 50 MG/ML
12.5 INJECTION, SOLUTION INTRAMUSCULAR; INTRAVENOUS
Status: DISCONTINUED | OUTPATIENT
Start: 2019-12-12 | End: 2019-12-12 | Stop reason: HOSPADM

## 2019-12-12 RX ORDER — ALBUTEROL SULFATE 90 UG/1
AEROSOL, METERED RESPIRATORY (INHALATION) AS NEEDED
Status: DISCONTINUED | OUTPATIENT
Start: 2019-12-12 | End: 2019-12-12 | Stop reason: HOSPADM

## 2019-12-12 RX ORDER — OXYCODONE HYDROCHLORIDE 5 MG/1
10 TABLET ORAL
Status: DISCONTINUED | OUTPATIENT
Start: 2019-12-12 | End: 2019-12-12 | Stop reason: HOSPADM

## 2019-12-12 RX ORDER — ONDANSETRON 2 MG/ML
INJECTION INTRAMUSCULAR; INTRAVENOUS AS NEEDED
Status: DISCONTINUED | OUTPATIENT
Start: 2019-12-12 | End: 2019-12-12 | Stop reason: HOSPADM

## 2019-12-12 RX ORDER — SODIUM CHLORIDE 0.9 % (FLUSH) 0.9 %
5-40 SYRINGE (ML) INJECTION EVERY 8 HOURS
Status: DISCONTINUED | OUTPATIENT
Start: 2019-12-12 | End: 2019-12-12 | Stop reason: HOSPADM

## 2019-12-12 RX ORDER — NALOXONE HYDROCHLORIDE 0.4 MG/ML
0.1 INJECTION, SOLUTION INTRAMUSCULAR; INTRAVENOUS; SUBCUTANEOUS AS NEEDED
Status: DISCONTINUED | OUTPATIENT
Start: 2019-12-12 | End: 2019-12-12 | Stop reason: HOSPADM

## 2019-12-12 RX ORDER — ONDANSETRON 2 MG/ML
4 INJECTION INTRAMUSCULAR; INTRAVENOUS ONCE
Status: DISCONTINUED | OUTPATIENT
Start: 2019-12-12 | End: 2019-12-12 | Stop reason: HOSPADM

## 2019-12-12 RX ORDER — FENTANYL CITRATE 50 UG/ML
INJECTION, SOLUTION INTRAMUSCULAR; INTRAVENOUS AS NEEDED
Status: DISCONTINUED | OUTPATIENT
Start: 2019-12-12 | End: 2019-12-12 | Stop reason: HOSPADM

## 2019-12-12 RX ORDER — FENTANYL CITRATE 50 UG/ML
100 INJECTION, SOLUTION INTRAMUSCULAR; INTRAVENOUS ONCE
Status: DISCONTINUED | OUTPATIENT
Start: 2019-12-12 | End: 2019-12-12 | Stop reason: HOSPADM

## 2019-12-12 RX ORDER — SUCCINYLCHOLINE CHLORIDE 20 MG/ML
INJECTION INTRAMUSCULAR; INTRAVENOUS AS NEEDED
Status: DISCONTINUED | OUTPATIENT
Start: 2019-12-12 | End: 2019-12-12 | Stop reason: HOSPADM

## 2019-12-12 RX ORDER — HYDROMORPHONE HYDROCHLORIDE 2 MG/ML
0.5 INJECTION, SOLUTION INTRAMUSCULAR; INTRAVENOUS; SUBCUTANEOUS
Status: DISCONTINUED | OUTPATIENT
Start: 2019-12-12 | End: 2019-12-12 | Stop reason: HOSPADM

## 2019-12-12 RX ORDER — LIDOCAINE HYDROCHLORIDE 20 MG/ML
INJECTION, SOLUTION EPIDURAL; INFILTRATION; INTRACAUDAL; PERINEURAL AS NEEDED
Status: DISCONTINUED | OUTPATIENT
Start: 2019-12-12 | End: 2019-12-12 | Stop reason: HOSPADM

## 2019-12-12 RX ORDER — PROPOFOL 10 MG/ML
INJECTION, EMULSION INTRAVENOUS AS NEEDED
Status: DISCONTINUED | OUTPATIENT
Start: 2019-12-12 | End: 2019-12-12 | Stop reason: HOSPADM

## 2019-12-12 RX ORDER — ROPIVACAINE HYDROCHLORIDE 5 MG/ML
INJECTION, SOLUTION EPIDURAL; INFILTRATION; PERINEURAL AS NEEDED
Status: DISCONTINUED | OUTPATIENT
Start: 2019-12-12 | End: 2019-12-12 | Stop reason: HOSPADM

## 2019-12-12 RX ORDER — MIDAZOLAM HYDROCHLORIDE 1 MG/ML
2 INJECTION, SOLUTION INTRAMUSCULAR; INTRAVENOUS ONCE
Status: COMPLETED | OUTPATIENT
Start: 2019-12-12 | End: 2019-12-12

## 2019-12-12 RX ORDER — ROCURONIUM BROMIDE 10 MG/ML
INJECTION, SOLUTION INTRAVENOUS AS NEEDED
Status: DISCONTINUED | OUTPATIENT
Start: 2019-12-12 | End: 2019-12-12 | Stop reason: HOSPADM

## 2019-12-12 RX ORDER — SODIUM CHLORIDE 0.9 % (FLUSH) 0.9 %
5-40 SYRINGE (ML) INJECTION AS NEEDED
Status: DISCONTINUED | OUTPATIENT
Start: 2019-12-12 | End: 2019-12-12 | Stop reason: HOSPADM

## 2019-12-12 RX ADMIN — SODIUM CHLORIDE, SODIUM LACTATE, POTASSIUM CHLORIDE, AND CALCIUM CHLORIDE 100 ML/HR: 600; 310; 30; 20 INJECTION, SOLUTION INTRAVENOUS at 06:08

## 2019-12-12 RX ADMIN — ROCURONIUM BROMIDE 5 MG: 10 INJECTION, SOLUTION INTRAVENOUS at 07:36

## 2019-12-12 RX ADMIN — ALBUTEROL SULFATE 3 PUFF: 90 AEROSOL, METERED RESPIRATORY (INHALATION) at 07:43

## 2019-12-12 RX ADMIN — DEXAMETHASONE SODIUM PHOSPHATE 4 MG: 4 INJECTION, SOLUTION INTRAMUSCULAR; INTRAVENOUS at 07:43

## 2019-12-12 RX ADMIN — SUCCINYLCHOLINE CHLORIDE 160 MG: 20 INJECTION, SOLUTION INTRAMUSCULAR; INTRAVENOUS at 07:36

## 2019-12-12 RX ADMIN — FENTANYL CITRATE 100 MCG: 50 INJECTION INTRAMUSCULAR; INTRAVENOUS at 07:36

## 2019-12-12 RX ADMIN — Medication 5 MG: at 07:55

## 2019-12-12 RX ADMIN — Medication 2 G: at 07:31

## 2019-12-12 RX ADMIN — ONDANSETRON 4 MG: 2 INJECTION INTRAMUSCULAR; INTRAVENOUS at 07:43

## 2019-12-12 RX ADMIN — LIDOCAINE HYDROCHLORIDE 80 MG: 20 INJECTION, SOLUTION EPIDURAL; INFILTRATION; INTRACAUDAL; PERINEURAL at 07:36

## 2019-12-12 RX ADMIN — PROPOFOL 200 MG: 10 INJECTION, EMULSION INTRAVENOUS at 07:36

## 2019-12-12 RX ADMIN — Medication 10 MG: at 08:08

## 2019-12-12 RX ADMIN — MIDAZOLAM 2 MG: 1 INJECTION INTRAMUSCULAR; INTRAVENOUS at 07:10

## 2019-12-12 NOTE — ANESTHESIA POSTPROCEDURE EVALUATION
Procedure(s):  RIGHT NECK OPEN EXCISION OF LIPOMA .    general    Anesthesia Post Evaluation      Multimodal analgesia: multimodal analgesia used between 6 hours prior to anesthesia start to PACU discharge  Patient location during evaluation: bedside  Patient participation: complete - patient participated  Level of consciousness: awake and alert  Pain score: 1  Pain management: adequate  Airway patency: patent  Anesthetic complications: no  Cardiovascular status: acceptable  Respiratory status: acceptable  Hydration status: acceptable  Comments: Pt doing well. Ok to d/c home.    Post anesthesia nausea and vomiting:  none      Vitals Value Taken Time   /71 12/12/2019  9:10 AM   Temp 36.7 °C (98 °F) 12/12/2019  8:41 AM   Pulse 92 12/12/2019  9:10 AM   Resp 20 12/12/2019  9:10 AM   SpO2 96 % 12/12/2019  9:10 AM

## 2019-12-12 NOTE — DISCHARGE INSTRUCTIONS
Patient Education        Lipoma: Care Instructions  Your Care Instructions  A lipoma is a growth of fat just below the skin. It may feel soft and rubbery. Lipomas can occur anywhere on the body. But they are most common on the torso, neck, upper thighs, upper arms, and armpits. A lipoma does not turn into cancer. Lipomas usually are not treated, because most of them don't hurt or cause problems. But your doctor may remove a lipoma if it is painful, gets infected, or bothers you. Follow-up care is a key part of your treatment and safety. Be sure to make and go to all appointments, and call your doctor if you are having problems. It's also a good idea to know your test results and keep a list of the medicines you take. How can you care for yourself at home? · A lipoma usually needs no care at home unless your doctor made a cut (incision) to remove it. · If your doctor told you how to care for your incision, follow your doctor's instructions. If you did not get instructions, follow this general advice:  ? Wash around the incision with clean water 2 times a day. Don't use hydrogen peroxide or alcohol. These can slow healing. ? You may cover the incision with a thin layer of petroleum jelly, such as Vaseline, and a nonstick bandage. ? Apply more petroleum jelly and replace the bandage as needed. When should you call for help? Call your doctor now or seek immediate medical care if:    · You have signs of infection, such as:  ? Increased pain, swelling, warmth, or redness. ? Red streaks leading from the lipoma. ? Pus draining from the lipoma. ? A fever.    Watch closely for changes in your health, and be sure to contact your doctor if:    · The lipoma is growing or changing.     · You do not get better as expected. Where can you learn more? Go to http://sandeep-beatriz.info/. Enter X077 in the search box to learn more about \"Lipoma: Care Instructions. \"  Current as of: April 1, 2019  Content Version: 12.2  © 7190-0373 [employer]. Care instructions adapted under license by [employer] (which disclaims liability or warranty for this information). If you have questions about a medical condition or this instruction, always ask your healthcare professional. [address] any warranty or liability for your use of this information.        Instructions Following Ambulatory Surgery    Activity  · As tolerated and directed by your doctor  · Bathe or shower as directed by your doctor    Diet  · Clear liquids until no nausea or vomiting; then light diet for the first day  · Advance to regular diet on second day, unless your doctor orders otherwise  · If nausea and vomiting continues, call your doctor    Pain  · Take pain medication as directed by your doctor  ·  Call your doctor if pain is NOT relieved by medication  · DO NOT take aspirin or blood thinners until directed by your doctor    Follow-Up Phone Calls  · Will be made nursing staff  · If you have any problems, call your doctor as needed    Call your doctor if  · Excessive bleeding that does not stop after holding mild pressure over the area  · Temperature of 101 degrees F or above  · Redness,excessive swelling or bruising, and/or green or yellow, smelly discharge from incision    After Anesthesia  · For the first 24 hours: DO NOT Drive, Drink alcoholic beverages, or Make important decisions  · Be aware of dizziness following anesthesia and while taking pain medication

## 2019-12-12 NOTE — OP NOTES
[place]  OPERATIVE REPORT    Name:  Jaziel Reeder  MR#:  [id number]  :  1970  ACCOUNT #:  [de-identified]  DATE OF SERVICE:  2019    PREOPERATIVE DIAGNOSIS:  Neck lipoma. POSTOPERATIVE DIAGNOSIS:  Neck lipoma. PROCEDURE PERFORMED:  Excision of neck lipoma. SURGEON:  Dhruv Rouse. Lu Griffin MD    FIRST ASSISTANTFrances Reyez. ROXANNE Mckeon. Use of a first assistant was necessary to optimize the patient's safety and outcome. First assistant was present during the entire case. First assistant was utilized to carefully and meticulously dissect the tumor. Use of first assistant was necessary to optimize the patient's safety and outcome. Modifier 80 is applied. ANESTHESIA:  General.    COMPLICATIONS:  None. SPECIMENS REMOVED:  Specimen sent was a 7 x 5 cm lipoma. ESTIMATED BLOOD LOSS:  5 mL. PATHOLOGY:  7 x 5 cm neck lipoma. CPT CODE:  21907 with a modifier 80 for assistant surgeon. ICD-10 CODE:  D17.0. INDICATIONS:  The patient is a 50-year-old gentleman with a large mass on his neck. Preoperative physical exam, radiographs and an MR confirm a lipoma of the neck. He is now electively admitted for operative excision. PROCEDURE:  Following identification of the patient, the patient was taken to the operative suite. Following administration of general anesthesia, administration of 2 g of IV Ancef, the patient was positioned on the operating room table in the lateral decubitus position. Care was taken to pad both dependent lower and upper extremities. The neck and posterior aspect of the shoulder were then prepped and draped in the usual sterile fashion. A 6 cm incision was then performed. Skin was incised. Subcutaneous tissue was then dissected down to the subcutaneous fascia, which was dissected free. A large lipoma was then identified. It was noted to be intramuscular. It was very carefully and meticulously dissected free back to the stalk.   Once the lipoma was carefully dissected free in its entirety, it was delivered from the wound. It measured 7 x 5 cm. It was sent to Pathology as a specimen. At this point, the wound was then irrigated. Hemostasis was obtained with Bovie cautery. The wound was closed with 0 Vicryl figure-of-eight sutures and a 2-0 Prolene subcuticular stitch. It was injected with 10 mL of Naropin. A sterile dressing was applied. The patient was then transferred to the recovery room in stable condition. Catherine Park MD      [id number]  D:  12/12/2019 8:36  T:  12/12/2019 11:53  JOB #:  [id number]  CC:   MD Alin Garner NP

## 2019-12-12 NOTE — H&P
Date of Surgery Update:  Yo Barros was seen and examined. History and physical has been reviewed. The patient has been examined.  There have been no significant clinical changes since the completion of the originally dated History and Physical.    Signed By: Luis Miguel Anderson MD     December 12, 2019 6:15 AM

## 2020-05-12 ENCOUNTER — HOSPITAL ENCOUNTER (OUTPATIENT)
Dept: LAB | Age: 50
Discharge: HOME | End: 2020-05-12

## 2020-05-12 PROCEDURE — 88305 TISSUE EXAM BY PATHOLOGIST: CPT

## 2022-03-18 PROBLEM — E66.9 OBESITY (BMI 35.0-39.9 WITHOUT COMORBIDITY): Status: ACTIVE | Noted: 2018-05-01

## 2022-03-19 PROBLEM — S46.211A RUPTURE OF RIGHT DISTAL BICEPS TENDON: Status: ACTIVE | Noted: 2019-06-12

## 2022-03-19 PROBLEM — D17.0 LIPOMA OF NECK: Status: ACTIVE | Noted: 2019-12-07

## 2022-06-13 ENCOUNTER — HOSPITAL ENCOUNTER (OUTPATIENT)
Dept: PHYSICAL THERAPY | Age: 52
Setting detail: RECURRING SERIES
End: 2022-06-13
Payer: COMMERCIAL

## 2022-06-20 ENCOUNTER — HOSPITAL ENCOUNTER (OUTPATIENT)
Dept: PHYSICAL THERAPY | Age: 52
Setting detail: RECURRING SERIES
Discharge: HOME | End: 2022-06-23
Payer: COMMERCIAL

## 2022-06-20 PROCEDURE — 97161 PT EVAL LOW COMPLEX 20 MIN: CPT
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
## 2023-10-20 ENCOUNTER — HOSPITAL ENCOUNTER (OUTPATIENT)
Dept: PHYSICAL THERAPY | Age: 53
Setting detail: RECURRING SERIES
Discharge: HOME | End: 2023-10-23
Payer: COMMERCIAL

## 2023-10-20 DIAGNOSIS — M25.552 PAIN IN LEFT HIP: ICD-10-CM

## 2023-10-20 DIAGNOSIS — R10.32 LEFT GROIN PAIN: Primary | ICD-10-CM

## 2023-10-20 DIAGNOSIS — R26.2 DIFFICULTY IN WALKING, NOT ELSEWHERE CLASSIFIED: ICD-10-CM

## 2023-10-20 PROCEDURE — 97110 THERAPEUTIC EXERCISES: CPT

## 2023-10-20 PROCEDURE — 97162 PT EVAL MOD COMPLEX 30 MIN: CPT

## 2023-10-20 ASSESSMENT — PAIN DESCRIPTION - ORIENTATION: ORIENTATION: LEFT

## 2023-10-20 ASSESSMENT — PAIN SCALES - GENERAL: PAINLEVEL_OUTOF10: 3

## 2023-10-20 ASSESSMENT — PAIN DESCRIPTION - DESCRIPTORS: DESCRIPTORS: ACHING

## 2023-10-20 ASSESSMENT — PAIN DESCRIPTION - LOCATION: LOCATION: HIP;GROIN

## 2023-10-20 NOTE — THERAPY EVALUATION
Shiva Juares  : 1970  Primary: [address] ([address])  Secondary:  [facility] [place] [address] [facility]  [address] [place] [address]  Phone: [phone]  Fax: [phone] Plan Frequency: 1-2 times a week for 90 days. Plan of Care/Certification Expiration Date: 24      PT Visit Info:  Plan Frequency: 1-2 times a week for 90 days. Plan of Care/Certification Expiration Date: 24  Total # of Visits to Date: 1      Visit Count:  1                OUTPATIENT PHYSICAL THERAPY:             Initial Assessment 10/20/2023               Episode (PT-Left lower quadrant pain) Appt Desk         Treatment Diagnosis:    Left groin pain  Pain in left hip  Difficulty in walking, not elsewhere classified  Medical/Referring Diagnosis:    R10.32 (ICD-10-CM) - Left lower quadrant pain  Referring Physician:  Isaak Peterson MD MD Orders:  PT Eval and Treat   Return MD Appt:  Unknown  Date of Onset:  Onset Date:  (2023)      Allergies:  Ace inhibitors  Restrictions/Precautions:           Medications Last Reviewed:  10/20/2023     SUBJECTIVE   History of Injury/Illness (Reason for Referral):  Patient reports working out in April and hearing a click in his left hip. The next day, patient had increased pain in left groin. Symptoms have gotten worse. Patient went trout fishing in a stream in July and this exacerbated groin pain. MD put patient on diclofenac and this helped his pain. Patient tried to wean off medicine, but pain became worse. Patient is back on diclofenac. Patient rates current pain level as 2/10 with worst pain 7/10. Patient describes pain as an achy pain along left hip/groin. Aggravating factors are sit to stand activities, squatting, rolling in the bed, and extending leg. Patient walks with daughter 2-3 times a week for 45 minutes. Patient Stated Goal(s): \"To not have pain\".   Initial: Left Hip, Groin 3/10 Post Session: Left

## 2023-10-20 NOTE — PROGRESS NOTES
Darrick Alondra  : 1970  Primary: [address] ([address] [facility])  Secondary:  [facility] [address]  [address] [place] [address]  Phone: [phone]  Fax: [phone] Plan Frequency: 1-2 times a week for 90 days. Plan of Care/Certification Expiration Date: 24      >PT Visit Info:  Plan Frequency: 1-2 times a week for 90 days. Plan of Care/Certification Expiration Date: 24  Total # of Visits to Date: 1      Visit Count:  1    OUTPATIENT PHYSICAL THERAPY: Treatment Note 10/20/2023       Episode  }Appt Desk             Treatment Diagnosis:    Left groin pain  Pain in left hip  Difficulty in walking, not elsewhere classified  Medical/Referring Diagnosis:    R10.32 (ICD-10-CM) - Left lower quadrant pain  Referring Physician:  Hank Mancini MD MD Orders:  PT Eval and Treat   Date of Onset:  Onset Date:  (2023)     Allergies:   Ace inhibitors  Restrictions/Precautions:  No data recordedNo data recorded     Interventions Planned (Treatment may consist of any combination of the following):    Current Treatment Recommendations: Strengthening; ROM; Manual; Home exercise program; Modalities     >Subjective Comments:  Patient complains of left groin pain. >Initial: Left Hip, Groin 3/10>Post Session:  Left  Hip, Groin 3/10  Medications Last Reviewed:  10/20/2023  Updated Objective Findings:  See Evaluation Note from today  Treatment   THERAPEUTIC EXERCISE: (10 minutes):    Exercises per grid below to improve mobility. Required minimal verbal cues to promote proper body alignment. Progressed repetitions as indicated.    Date:  10/20/2023 Date:   Date:     Activity/Exercise Parameters Parameters Parameters   Supine hamstring stretch with belt 3x30 sec bilateral     Supine hip adductor stretch 3x30 sec bilateral     Supine hip flexor stretch-hanging leg off bed 3x30 sec bilateral                                 Treatment/Session Summary:

## 2023-10-26 ENCOUNTER — HOSPITAL ENCOUNTER (OUTPATIENT)
Dept: PHYSICAL THERAPY | Age: 53
Setting detail: RECURRING SERIES
Discharge: HOME | End: 2023-10-29
Payer: COMMERCIAL

## 2023-10-26 PROCEDURE — 97110 THERAPEUTIC EXERCISES: CPT

## 2023-10-26 PROCEDURE — 97140 MANUAL THERAPY 1/> REGIONS: CPT

## 2023-10-26 ASSESSMENT — PAIN SCALES - GENERAL: PAINLEVEL_OUTOF10: 3

## 2023-10-26 ASSESSMENT — PAIN DESCRIPTION - DESCRIPTORS: DESCRIPTORS: ACHING

## 2023-10-26 ASSESSMENT — PAIN DESCRIPTION - LOCATION: LOCATION: HIP;GROIN

## 2023-10-26 ASSESSMENT — PAIN DESCRIPTION - ORIENTATION: ORIENTATION: LEFT

## 2023-10-26 NOTE — PROGRESS NOTES
Juni Velez  : 1970  Primary: [address] ([address] [facility])  Secondary:   [address] [facility] [address] [place]  [address] [place] [address]  Phone: [phone]  Fax: [phone] Plan Frequency: 1-2 times a week for 90 days. Plan of Care/Certification Expiration Date: 24      >PT Visit Info:  Plan Frequency: 1-2 times a week for 90 days. Plan of Care/Certification Expiration Date: 24  Total # of Visits to Date: 2      Visit Count:  2    OUTPATIENT PHYSICAL THERAPY: Treatment Note 10/26/2023       Episode  }Appt Desk             Treatment Diagnosis:    Left groin pain  Pain in left hip  Difficulty in walking, not elsewhere classified  Medical/Referring Diagnosis:    R10.32 (ICD-10-CM) - Left lower quadrant pain  Referring Physician:  Jean Carlos Vaughn MD MD Orders:  PT Eval and Treat   Date of Onset:  Onset Date:  (2023)     Allergies:   Ace inhibitors  Restrictions/Precautions:  No data recordedNo data recorded     Interventions Planned (Treatment may consist of any combination of the following):    Current Treatment Recommendations: Strengthening; ROM; Manual; Home exercise program; Modalities     >Subjective Comments: \"It is about the same\". >Initial: Left Hip, Groin 3/10>Post Session:  Left  Hip, Groin 2/10  Medications Last Reviewed:  10/26/2023  Updated Objective Findings:  None Today  Treatment   THERAPEUTIC EXERCISE: (30 minutes):    Exercises per grid below to improve mobility. Required minimal verbal cues to promote proper body alignment. Progressed repetitions as indicated.    Date:  10/20/2023 Date:  10/26/23 Date:     Activity/Exercise Parameters Parameters Parameters   Supine hamstring stretch with belt 3x30 sec bilateral 3x30 sec bilateral    Supine hip adductor stretch 3x30 sec bilateral 3x30 sec bilateral    Supine hip flexor stretch-hanging leg off bed 3x30 sec bilateral 3x30 sec bilateral    Nustep  Level 3 x 8 minutes

## 2023-11-03 ENCOUNTER — HOSPITAL ENCOUNTER (OUTPATIENT)
Dept: PHYSICAL THERAPY | Age: 53
Setting detail: RECURRING SERIES
Discharge: HOME | End: 2023-11-06
Payer: COMMERCIAL

## 2023-11-03 PROCEDURE — 97140 MANUAL THERAPY 1/> REGIONS: CPT

## 2023-11-03 PROCEDURE — 97110 THERAPEUTIC EXERCISES: CPT

## 2023-11-03 ASSESSMENT — PAIN DESCRIPTION - ORIENTATION: ORIENTATION: LEFT

## 2023-11-03 ASSESSMENT — PAIN SCALES - GENERAL: PAINLEVEL_OUTOF10: 3

## 2023-11-03 ASSESSMENT — PAIN DESCRIPTION - LOCATION: LOCATION: HIP;GROIN

## 2023-11-03 ASSESSMENT — PAIN DESCRIPTION - DESCRIPTORS: DESCRIPTORS: ACHING

## 2023-11-03 NOTE — PROGRESS NOTES
John Pereira  : 1970  Primary: [address] ([address] [facility])  Secondary:  [facility] [place] [address] [facility] [place]  [address] [place] [address]  Phone: [phone]  Fax: [phone] Plan Frequency: 1-2 times a week for 90 days. Plan of Care/Certification Expiration Date: 24      >PT Visit Info:  Plan Frequency: 1-2 times a week for 90 days. Plan of Care/Certification Expiration Date: 24  Total # of Visits to Date: 3      Visit Count:  3    OUTPATIENT PHYSICAL THERAPY: Treatment Note 11/3/2023       Episode  }Appt Desk             Treatment Diagnosis:    Left groin pain  Pain in left hip  Difficulty in walking, not elsewhere classified  Medical/Referring Diagnosis:    R10.32 (ICD-10-CM) - Left lower quadrant pain  Referring Physician:  Jessica Frankel.MD DAS Orders:  PT Eval and Treat   Date of Onset:  Onset Date:  (2023)     Allergies:   Ace inhibitors  Restrictions/Precautions:  No data recordedNo data recorded     Interventions Planned (Treatment may consist of any combination of the following):    Current Treatment Recommendations: Strengthening; ROM; Manual; Home exercise program; Modalities     >Subjective Comments: \"It has been okay\". >Initial: Left Hip, Groin 3/10>Post Session:  Left  Hip, Groin 3/10  Medications Last Reviewed:  11/3/2023  Updated Objective Findings:  None Today  Treatment   THERAPEUTIC EXERCISE: (30 minutes):    Exercises per grid below to improve mobility. Required minimal verbal cues to promote proper body alignment. Progressed repetitions as indicated.    Date:  10/20/2023 Date:  10/26/23 Date:  11/3/23   Activity/Exercise Parameters Parameters Parameters   Supine hamstring stretch with belt 3x30 sec bilateral 3x30 sec bilateral 3x30 sec bilateral   Supine hip adductor stretch 3x30 sec bilateral 3x30 sec bilateral 3x30 sec bilateral   Supine hip flexor stretch-hanging leg off bed 3x30 sec bilateral 3x30 sec

## 2023-11-10 ENCOUNTER — HOSPITAL ENCOUNTER (OUTPATIENT)
Dept: PHYSICAL THERAPY | Age: 53
Setting detail: RECURRING SERIES
Discharge: HOME | End: 2023-11-13
Payer: COMMERCIAL

## 2023-11-10 PROCEDURE — 97140 MANUAL THERAPY 1/> REGIONS: CPT

## 2023-11-10 PROCEDURE — 97110 THERAPEUTIC EXERCISES: CPT

## 2023-11-10 ASSESSMENT — PAIN DESCRIPTION - DESCRIPTORS: DESCRIPTORS: ACHING

## 2023-11-10 ASSESSMENT — PAIN SCALES - GENERAL: PAINLEVEL_OUTOF10: 3

## 2023-11-10 ASSESSMENT — PAIN DESCRIPTION - ORIENTATION: ORIENTATION: LEFT

## 2023-11-10 ASSESSMENT — PAIN DESCRIPTION - LOCATION: LOCATION: HIP;GROIN

## 2023-11-10 NOTE — PROGRESS NOTES
Flakita Gavin  : 1970  Primary: [address] ([address] [facility])  Secondary:  [facility] [address]  [address] [place] [address]  Phone: [phone]  Fax: [phone] Plan Frequency: 1-2 times a week for 90 days. Plan of Care/Certification Expiration Date: 24      >PT Visit Info:  Plan Frequency: 1-2 times a week for 90 days. Plan of Care/Certification Expiration Date: 24  Total # of Visits to Date: 4      Visit Count:  4    OUTPATIENT PHYSICAL THERAPY: Treatment Note 11/10/2023       Episode  }Appt Desk             Treatment Diagnosis:    Left groin pain  Pain in left hip  Difficulty in walking, not elsewhere classified  Medical/Referring Diagnosis:    R10.32 (ICD-10-CM) - Left lower quadrant pain  Referring Physician:  Binu Jovel.MD DAS Orders:  PT Eval and Treat   Date of Onset:  Onset Date:  (2023)     Allergies:   Ace inhibitors  Restrictions/Precautions:  No data recordedNo data recorded     Interventions Planned (Treatment may consist of any combination of the following):    Current Treatment Recommendations: Strengthening; ROM; Manual; Home exercise program; Modalities     >Subjective Comments: \"It hasn't really changed that much\". >Initial: Left Hip, Groin 3/10>Post Session:  Left  Hip, Groin 3/10  Medications Last Reviewed:  11/10/2023  Updated Objective Findings:  None Today  Treatment   THERAPEUTIC EXERCISE: (26 minutes):    Exercises per grid below to improve mobility. Required minimal verbal cues to promote proper body alignment. Progressed repetitions as indicated.    Date:  11/10/2023 Date:  10/26/23 Date:  11/3/23   Activity/Exercise Parameters Parameters Parameters   Supine hamstring stretch with belt 3x30 sec bilateral 3x30 sec bilateral 3x30 sec bilateral   Supine hip adductor stretch 3x30 sec bilateral 3x30 sec bilateral 3x30 sec bilateral   Supine hip flexor stretch-hanging leg off bed 3x30 sec

## 2023-11-17 ENCOUNTER — APPOINTMENT (OUTPATIENT)
Dept: PHYSICAL THERAPY | Age: 53
End: 2023-11-17
Payer: COMMERCIAL

## 2023-12-15 ENCOUNTER — OFFICE VISIT (OUTPATIENT)
Dept: ORTHOPEDIC SURGERY | Age: 53
End: 2023-12-15
Payer: COMMERCIAL

## 2023-12-15 DIAGNOSIS — M16.12 PRIMARY OSTEOARTHRITIS OF LEFT HIP: ICD-10-CM

## 2023-12-15 DIAGNOSIS — M25.552 LEFT HIP PAIN: Primary | ICD-10-CM

## 2023-12-15 PROCEDURE — 99204 OFFICE O/P NEW MOD 45 MIN: CPT | Performed by: PHYSICIAN ASSISTANT
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
## (undated) DEVICE — GOWN,REINFORCED,POLY,AURORA,XXLARGE,STR: Brand: MEDLINE

## (undated) DEVICE — DRAPE,U/SHT,SPLIT,FILM,60X84,STERILE: Brand: MEDLINE

## (undated) DEVICE — STOCKINETTE TUBE 9X48 -- MEDICHOICE

## (undated) DEVICE — SUTURE PROL SZ 2-0 L18IN NONABSORBABLE BLU FS L26MM 3/8 CIR 8685H

## (undated) DEVICE — REM POLYHESIVE ADULT PATIENT RETURN ELECTRODE: Brand: VALLEYLAB

## (undated) DEVICE — SUTURE VCRL SZ 0 L27IN ABSRB UD L36MM CP-1 1/2 CIR REV CUT J267H

## (undated) DEVICE — COVER LT HNDL FOR OR SURG LAMP

## (undated) DEVICE — SYR 10ML LUER LOK 1/5ML GRAD --

## (undated) DEVICE — AMD ANTIMICROBIAL GAUZE SPONGES,12 PLY USP TYPE VII, 0.2% POLYHEXAMETHYLENE BIGUANIDE HCI (PHMB): Brand: CURITY

## (undated) DEVICE — NEEDLE HYPO 18GA L1.5IN PNK S STL HUB POLYPR SHLD REG BVL

## (undated) DEVICE — SOLUTION IRRIG 3000ML 0.9% SOD CHL FLX CONT 0797208] ICU MEDICAL INC]

## (undated) DEVICE — SHEET, DRAPE, SPLIT, STERILE: Brand: MEDLINE

## (undated) DEVICE — BUTTON SWITCH PENCIL BLADE ELECTRODE, HOLSTER: Brand: EDGE

## (undated) DEVICE — ELECTRODE NDL 2.8IN COAT VALLEYLAB

## (undated) DEVICE — ADHESIVE TOP BENZ TINC SWABSTK --

## (undated) DEVICE — PADDING CAST W4INXL4YD ST COT COHESIVE HND TEARABLE SPEC

## (undated) DEVICE — SOLUTION IV 1000ML 0.9% SOD CHL

## (undated) DEVICE — SURGICAL PROCEDURE PACK BASIC ST FRANCIS

## (undated) DEVICE — DRSG AQUACEL SURG 3.5X6IN -- CONVERT TO ITEM 369227

## (undated) DEVICE — (D)STRIP SKN CLSR 0.5X4IN WHT --

## (undated) DEVICE — ABDOMINAL PAD: Brand: DERMACEA

## (undated) DEVICE — DRAPE,TOP,102X53,STERILE: Brand: MEDLINE

## (undated) DEVICE — Device

## (undated) DEVICE — CONTAINER AINER SPEC 120ML ORNG LID ST CUP

## (undated) DEVICE — 2000CC GUARDIAN II: Brand: GUARDIAN

## (undated) DEVICE — STERILE HOOK LOCK LATEX FREE ELASTIC BANDAGE 6INX5YD: Brand: HOOK LOCK™

## (undated) DEVICE — STRIP,CLOSURE,WOUND,MEDI-STRIP,1/2X4: Brand: MEDLINE

## (undated) DEVICE — (D)PREP SKN CHLRAPRP APPL 26ML -- CONVERT TO ITEM 371833

## (undated) DEVICE — GAUZE,SPONGE,4"X4",16PLY,STRL,LF,10/TRAY: Brand: MEDLINE

## (undated) DEVICE — STOCKINETTE TUBE 6X48 -- MEDICHOICE

## (undated) DEVICE — SUTURE ETHLN SZ 2-0 L18IN NONABSORBABLE BLK L26MM PS 3/8 585H

## (undated) DEVICE — STERILE HOOK LOCK LATEX FREE ELASTIC BANDAGE 4INX5YD: Brand: HOOK LOCK™

## (undated) DEVICE — BANDAGE COMPR SELF ADH 5 YDX4 IN TAN STRL PREMIERPRO LF

## (undated) DEVICE — HANDPIECE SET WITH COAXIAL HIGH FLOW TIP AND SUCTION TUBE: Brand: INTERPULSE

## (undated) DEVICE — SUTURE ETHBND EXCEL SZ 2 L27IN NONABSORBABLE GRN WHT MO-7 D7485

## (undated) DEVICE — PADDING CAST W6INXL4YD ST COT COHESIVE HND TEARABLE SPEC